# Patient Record
Sex: MALE | Race: WHITE | NOT HISPANIC OR LATINO | Employment: OTHER | URBAN - METROPOLITAN AREA
[De-identification: names, ages, dates, MRNs, and addresses within clinical notes are randomized per-mention and may not be internally consistent; named-entity substitution may affect disease eponyms.]

---

## 2017-02-13 ENCOUNTER — APPOINTMENT (OUTPATIENT)
Dept: CT IMAGING | Facility: HOSPITAL | Age: 62
End: 2017-02-13

## 2017-02-13 ENCOUNTER — HOSPITAL ENCOUNTER (INPATIENT)
Facility: HOSPITAL | Age: 62
LOS: 6 days | Discharge: HOME | End: 2017-02-19
Attending: EMERGENCY MEDICINE | Admitting: HOSPITALIST

## 2017-02-13 DIAGNOSIS — L03.114 CELLULITIS OF LEFT UPPER EXTREMITY: ICD-10-CM

## 2017-02-13 DIAGNOSIS — L03.113 CELLULITIS OF RIGHT UPPER EXTREMITY: ICD-10-CM

## 2017-02-13 DIAGNOSIS — M54.50 ACUTE MIDLINE LOW BACK PAIN WITHOUT SCIATICA: ICD-10-CM

## 2017-02-13 DIAGNOSIS — A41.9 SEPSIS, DUE TO UNSPECIFIED ORGANISM: Primary | ICD-10-CM

## 2017-02-13 LAB
ALBUMIN SERPL-MCNC: 4 G/DL (ref 3.5–5.2)
ALBUMIN/GLOB SERPL: 1.1 G/DL
ALP SERPL-CCNC: 53 U/L (ref 39–117)
ALT SERPL W P-5'-P-CCNC: 38 U/L (ref 1–41)
ANION GAP SERPL CALCULATED.3IONS-SCNC: 16.8 MMOL/L
AST SERPL-CCNC: 28 U/L (ref 1–40)
BACTERIA UR QL AUTO: ABNORMAL /HPF
BASOPHILS # BLD AUTO: 0.02 10*3/MM3 (ref 0–0.2)
BASOPHILS NFR BLD AUTO: 0.1 % (ref 0–1.5)
BILIRUB SERPL-MCNC: 0.9 MG/DL (ref 0.1–1.2)
BILIRUB UR QL STRIP: NEGATIVE
BUN BLD-MCNC: 18 MG/DL (ref 8–23)
BUN/CREAT SERPL: 17.5 (ref 7–25)
CALCIUM SPEC-SCNC: 9.3 MG/DL (ref 8.6–10.5)
CHLORIDE SERPL-SCNC: 100 MMOL/L (ref 98–107)
CLARITY UR: ABNORMAL
CO2 SERPL-SCNC: 22.2 MMOL/L (ref 22–29)
COLOR UR: ABNORMAL
CREAT BLD-MCNC: 1.03 MG/DL (ref 0.76–1.27)
CRP SERPL-MCNC: 5.46 MG/DL (ref 0–0.5)
D-LACTATE SERPL-SCNC: 2.5 MMOL/L (ref 0.5–2)
DEPRECATED RDW RBC AUTO: 57.3 FL (ref 37–54)
EOSINOPHIL # BLD AUTO: 0.01 10*3/MM3 (ref 0–0.7)
EOSINOPHIL NFR BLD AUTO: 0.1 % (ref 0.3–6.2)
ERYTHROCYTE [DISTWIDTH] IN BLOOD BY AUTOMATED COUNT: 16 % (ref 11.5–14.5)
ERYTHROCYTE [SEDIMENTATION RATE] IN BLOOD: 37 MM/HR (ref 0–20)
GFR SERPL CREATININE-BSD FRML MDRD: 74 ML/MIN/1.73
GLOBULIN UR ELPH-MCNC: 3.6 GM/DL
GLUCOSE BLD-MCNC: 82 MG/DL (ref 65–99)
GLUCOSE UR STRIP-MCNC: NEGATIVE MG/DL
HCT VFR BLD AUTO: 40.7 % (ref 40.4–52.2)
HGB BLD-MCNC: 13.5 G/DL (ref 13.7–17.6)
HGB UR QL STRIP.AUTO: NEGATIVE
HYALINE CASTS UR QL AUTO: ABNORMAL /LPF
IMM GRANULOCYTES # BLD: 0.09 10*3/MM3 (ref 0–0.03)
IMM GRANULOCYTES NFR BLD: 0.5 % (ref 0–0.5)
KETONES UR QL STRIP: ABNORMAL
LEUKOCYTE ESTERASE UR QL STRIP.AUTO: ABNORMAL
LYMPHOCYTES # BLD AUTO: 1.24 10*3/MM3 (ref 0.9–4.8)
LYMPHOCYTES NFR BLD AUTO: 7.4 % (ref 19.6–45.3)
MCH RBC QN AUTO: 32.4 PG (ref 27–32.7)
MCHC RBC AUTO-ENTMCNC: 33.2 G/DL (ref 32.6–36.4)
MCV RBC AUTO: 97.6 FL (ref 79.8–96.2)
MONOCYTES # BLD AUTO: 1.86 10*3/MM3 (ref 0.2–1.2)
MONOCYTES NFR BLD AUTO: 11.2 % (ref 5–12)
NEUTROPHILS # BLD AUTO: 13.46 10*3/MM3 (ref 1.9–8.1)
NEUTROPHILS NFR BLD AUTO: 80.7 % (ref 42.7–76)
NITRITE UR QL STRIP: NEGATIVE
PH UR STRIP.AUTO: 6 [PH] (ref 5–8)
PLATELET # BLD AUTO: 252 10*3/MM3 (ref 140–500)
PMV BLD AUTO: 10.3 FL (ref 6–12)
POTASSIUM BLD-SCNC: 4.1 MMOL/L (ref 3.5–5.2)
PROT SERPL-MCNC: 7.6 G/DL (ref 6–8.5)
PROT UR QL STRIP: ABNORMAL
RBC # BLD AUTO: 4.17 10*6/MM3 (ref 4.6–6)
RBC # UR: ABNORMAL /HPF
REF LAB TEST METHOD: ABNORMAL
SODIUM BLD-SCNC: 139 MMOL/L (ref 136–145)
SP GR UR STRIP: >=1.03 (ref 1–1.03)
SQUAMOUS #/AREA URNS HPF: ABNORMAL /HPF
UROBILINOGEN UR QL STRIP: ABNORMAL
WBC NRBC COR # BLD: 16.68 10*3/MM3 (ref 4.5–10.7)
WBC UR QL AUTO: ABNORMAL /HPF

## 2017-02-13 PROCEDURE — 99285 EMERGENCY DEPT VISIT HI MDM: CPT

## 2017-02-13 PROCEDURE — 87150 DNA/RNA AMPLIFIED PROBE: CPT | Performed by: PHYSICIAN ASSISTANT

## 2017-02-13 PROCEDURE — 25010000002 HYDROMORPHONE PER 4 MG: Performed by: EMERGENCY MEDICINE

## 2017-02-13 PROCEDURE — 85025 COMPLETE CBC W/AUTO DIFF WBC: CPT | Performed by: PHYSICIAN ASSISTANT

## 2017-02-13 PROCEDURE — 87086 URINE CULTURE/COLONY COUNT: CPT | Performed by: PHYSICIAN ASSISTANT

## 2017-02-13 PROCEDURE — 80053 COMPREHEN METABOLIC PANEL: CPT | Performed by: PHYSICIAN ASSISTANT

## 2017-02-13 PROCEDURE — 25010000002 ONDANSETRON PER 1 MG: Performed by: EMERGENCY MEDICINE

## 2017-02-13 PROCEDURE — 86140 C-REACTIVE PROTEIN: CPT | Performed by: PHYSICIAN ASSISTANT

## 2017-02-13 PROCEDURE — 74176 CT ABD & PELVIS W/O CONTRAST: CPT

## 2017-02-13 PROCEDURE — 83605 ASSAY OF LACTIC ACID: CPT | Performed by: PHYSICIAN ASSISTANT

## 2017-02-13 PROCEDURE — 87040 BLOOD CULTURE FOR BACTERIA: CPT | Performed by: PHYSICIAN ASSISTANT

## 2017-02-13 PROCEDURE — 85652 RBC SED RATE AUTOMATED: CPT | Performed by: PHYSICIAN ASSISTANT

## 2017-02-13 PROCEDURE — 87186 SC STD MICRODIL/AGAR DIL: CPT | Performed by: PHYSICIAN ASSISTANT

## 2017-02-13 PROCEDURE — 81001 URINALYSIS AUTO W/SCOPE: CPT | Performed by: PHYSICIAN ASSISTANT

## 2017-02-13 PROCEDURE — 87147 CULTURE TYPE IMMUNOLOGIC: CPT | Performed by: PHYSICIAN ASSISTANT

## 2017-02-13 RX ORDER — ONDANSETRON 2 MG/ML
4 INJECTION INTRAMUSCULAR; INTRAVENOUS ONCE
Status: COMPLETED | OUTPATIENT
Start: 2017-02-13 | End: 2017-02-13

## 2017-02-13 RX ORDER — ONDANSETRON 2 MG/ML
4 INJECTION INTRAMUSCULAR; INTRAVENOUS ONCE
Status: DISCONTINUED | OUTPATIENT
Start: 2017-02-13 | End: 2017-02-13

## 2017-02-13 RX ORDER — SODIUM CHLORIDE 0.9 % (FLUSH) 0.9 %
10 SYRINGE (ML) INJECTION AS NEEDED
Status: DISCONTINUED | OUTPATIENT
Start: 2017-02-13 | End: 2017-02-19 | Stop reason: HOSPADM

## 2017-02-13 RX ORDER — ACETAMINOPHEN 500 MG
1000 TABLET ORAL ONCE
Status: COMPLETED | OUTPATIENT
Start: 2017-02-13 | End: 2017-02-13

## 2017-02-13 RX ADMIN — ACETAMINOPHEN 1000 MG: 500 TABLET ORAL at 20:44

## 2017-02-13 RX ADMIN — SODIUM CHLORIDE 1382 ML: 9 INJECTION, SOLUTION INTRAVENOUS at 21:21

## 2017-02-13 RX ADMIN — HYDROMORPHONE HYDROCHLORIDE 1 MG: 1 INJECTION, SOLUTION INTRAMUSCULAR; INTRAVENOUS; SUBCUTANEOUS at 21:20

## 2017-02-13 RX ADMIN — ONDANSETRON 4 MG: 2 INJECTION INTRAMUSCULAR; INTRAVENOUS at 21:17

## 2017-02-13 RX ADMIN — SODIUM CHLORIDE 1000 ML: 9 INJECTION, SOLUTION INTRAVENOUS at 20:50

## 2017-02-14 ENCOUNTER — APPOINTMENT (OUTPATIENT)
Dept: ULTRASOUND IMAGING | Facility: HOSPITAL | Age: 62
End: 2017-02-14
Attending: SURGERY

## 2017-02-14 ENCOUNTER — APPOINTMENT (OUTPATIENT)
Dept: MRI IMAGING | Facility: HOSPITAL | Age: 62
End: 2017-02-14
Attending: HOSPITALIST

## 2017-02-14 ENCOUNTER — APPOINTMENT (OUTPATIENT)
Dept: CARDIOLOGY | Facility: HOSPITAL | Age: 62
End: 2017-02-14
Attending: HOSPITALIST

## 2017-02-14 PROBLEM — L03.119 CELLULITIS OF UPPER EXTREMITY: Status: ACTIVE | Noted: 2017-02-14

## 2017-02-14 PROBLEM — M54.50 LOW BACK PAIN: Status: ACTIVE | Noted: 2017-02-14

## 2017-02-14 PROBLEM — F19.10 DRUG ABUSE (HCC): Status: ACTIVE | Noted: 2017-02-14

## 2017-02-14 LAB
ANION GAP SERPL CALCULATED.3IONS-SCNC: 14.2 MMOL/L
BACTERIA BLD CULT: ABNORMAL
BASOPHILS # BLD AUTO: 0.02 10*3/MM3 (ref 0–0.2)
BASOPHILS NFR BLD AUTO: 0.1 % (ref 0–1.5)
BUN BLD-MCNC: 17 MG/DL (ref 8–23)
BUN/CREAT SERPL: 19.8 (ref 7–25)
CALCIUM SPEC-SCNC: 8.4 MG/DL (ref 8.6–10.5)
CHLORIDE SERPL-SCNC: 104 MMOL/L (ref 98–107)
CO2 SERPL-SCNC: 21.8 MMOL/L (ref 22–29)
CREAT BLD-MCNC: 0.86 MG/DL (ref 0.76–1.27)
D-LACTATE SERPL-SCNC: 0.7 MMOL/L (ref 0.5–2)
DEPRECATED RDW RBC AUTO: 59.5 FL (ref 37–54)
EOSINOPHIL # BLD AUTO: 0.02 10*3/MM3 (ref 0–0.7)
EOSINOPHIL NFR BLD AUTO: 0.1 % (ref 0.3–6.2)
ERYTHROCYTE [DISTWIDTH] IN BLOOD BY AUTOMATED COUNT: 16.4 % (ref 11.5–14.5)
GFR SERPL CREATININE-BSD FRML MDRD: 91 ML/MIN/1.73
GLUCOSE BLD-MCNC: 87 MG/DL (ref 65–99)
HCT VFR BLD AUTO: 36.7 % (ref 40.4–52.2)
HGB BLD-MCNC: 11.9 G/DL (ref 13.7–17.6)
IMM GRANULOCYTES # BLD: 0.06 10*3/MM3 (ref 0–0.03)
IMM GRANULOCYTES NFR BLD: 0.4 % (ref 0–0.5)
LYMPHOCYTES # BLD AUTO: 1.51 10*3/MM3 (ref 0.9–4.8)
LYMPHOCYTES NFR BLD AUTO: 10.4 % (ref 19.6–45.3)
MCH RBC QN AUTO: 32.1 PG (ref 27–32.7)
MCHC RBC AUTO-ENTMCNC: 32.4 G/DL (ref 32.6–36.4)
MCV RBC AUTO: 98.9 FL (ref 79.8–96.2)
MONOCYTES # BLD AUTO: 1.25 10*3/MM3 (ref 0.2–1.2)
MONOCYTES NFR BLD AUTO: 8.6 % (ref 5–12)
NEUTROPHILS # BLD AUTO: 11.61 10*3/MM3 (ref 1.9–8.1)
NEUTROPHILS NFR BLD AUTO: 80.4 % (ref 42.7–76)
PLATELET # BLD AUTO: 257 10*3/MM3 (ref 140–500)
PMV BLD AUTO: 10 FL (ref 6–12)
POTASSIUM BLD-SCNC: 4.1 MMOL/L (ref 3.5–5.2)
PROCALCITONIN SERPL-MCNC: 0.14 NG/ML (ref 0.1–0.25)
RBC # BLD AUTO: 3.71 10*6/MM3 (ref 4.6–6)
SODIUM BLD-SCNC: 140 MMOL/L (ref 136–145)
WBC NRBC COR # BLD: 14.47 10*3/MM3 (ref 4.5–10.7)

## 2017-02-14 PROCEDURE — 25010000002 LORAZEPAM PER 2 MG: Performed by: HOSPITALIST

## 2017-02-14 PROCEDURE — 85025 COMPLETE CBC W/AUTO DIFF WBC: CPT | Performed by: HOSPITALIST

## 2017-02-14 PROCEDURE — 87081 CULTURE SCREEN ONLY: CPT | Performed by: HOSPITALIST

## 2017-02-14 PROCEDURE — 76882 US LMTD JT/FCL EVL NVASC XTR: CPT

## 2017-02-14 PROCEDURE — 99255 IP/OBS CONSLTJ NEW/EST HI 80: CPT | Performed by: INTERNAL MEDICINE

## 2017-02-14 PROCEDURE — 80048 BASIC METABOLIC PNL TOTAL CA: CPT | Performed by: HOSPITALIST

## 2017-02-14 PROCEDURE — 25010000002 VANCOMYCIN: Performed by: HOSPITALIST

## 2017-02-14 PROCEDURE — 93306 TTE W/DOPPLER COMPLETE: CPT | Performed by: INTERNAL MEDICINE

## 2017-02-14 PROCEDURE — A9577 INJ MULTIHANCE: HCPCS | Performed by: HOSPITALIST

## 2017-02-14 PROCEDURE — 25010000002 HYDROMORPHONE PER 4 MG: Performed by: HOSPITALIST

## 2017-02-14 PROCEDURE — 25010000003 CEFTRIAXONE PER 250 MG: Performed by: HOSPITALIST

## 2017-02-14 PROCEDURE — 72158 MRI LUMBAR SPINE W/O & W/DYE: CPT

## 2017-02-14 PROCEDURE — 84145 PROCALCITONIN (PCT): CPT | Performed by: INTERNAL MEDICINE

## 2017-02-14 PROCEDURE — 25010000002 ONDANSETRON PER 1 MG: Performed by: HOSPITALIST

## 2017-02-14 PROCEDURE — 0 GADOBENATE DIMEGLUMINE 529 MG/ML SOLUTION: Performed by: HOSPITALIST

## 2017-02-14 PROCEDURE — 83605 ASSAY OF LACTIC ACID: CPT | Performed by: EMERGENCY MEDICINE

## 2017-02-14 PROCEDURE — 93306 TTE W/DOPPLER COMPLETE: CPT

## 2017-02-14 RX ORDER — CHOLECALCIFEROL (VITAMIN D3) 125 MCG
5 CAPSULE ORAL NIGHTLY
COMMUNITY

## 2017-02-14 RX ORDER — CEFTRIAXONE SODIUM 2 G/50ML
2 INJECTION, SOLUTION INTRAVENOUS EVERY 24 HOURS
Status: DISCONTINUED | OUTPATIENT
Start: 2017-02-15 | End: 2017-02-15

## 2017-02-14 RX ORDER — DOCUSATE SODIUM 100 MG/1
100 CAPSULE, LIQUID FILLED ORAL 2 TIMES DAILY PRN
Status: DISCONTINUED | OUTPATIENT
Start: 2017-02-14 | End: 2017-02-17

## 2017-02-14 RX ORDER — ACETAMINOPHEN 325 MG/1
650 TABLET ORAL EVERY 6 HOURS PRN
Status: DISCONTINUED | OUTPATIENT
Start: 2017-02-14 | End: 2017-02-19 | Stop reason: HOSPADM

## 2017-02-14 RX ORDER — ONDANSETRON 2 MG/ML
4 INJECTION INTRAMUSCULAR; INTRAVENOUS EVERY 6 HOURS PRN
Status: DISCONTINUED | OUTPATIENT
Start: 2017-02-14 | End: 2017-02-19 | Stop reason: HOSPADM

## 2017-02-14 RX ORDER — CEFTRIAXONE SODIUM 2 G/50ML
2 INJECTION, SOLUTION INTRAVENOUS EVERY 12 HOURS
Status: DISCONTINUED | OUTPATIENT
Start: 2017-02-14 | End: 2017-02-14

## 2017-02-14 RX ORDER — LORAZEPAM 2 MG/ML
1 INJECTION INTRAMUSCULAR EVERY 4 HOURS PRN
Status: DISCONTINUED | OUTPATIENT
Start: 2017-02-14 | End: 2017-02-16

## 2017-02-14 RX ORDER — HYDROMORPHONE HYDROCHLORIDE 1 MG/ML
0.5 INJECTION, SOLUTION INTRAMUSCULAR; INTRAVENOUS; SUBCUTANEOUS
Status: DISCONTINUED | OUTPATIENT
Start: 2017-02-14 | End: 2017-02-15

## 2017-02-14 RX ORDER — CHOLECALCIFEROL (VITAMIN D3) 125 MCG
5 CAPSULE ORAL NIGHTLY
Status: DISCONTINUED | OUTPATIENT
Start: 2017-02-14 | End: 2017-02-19 | Stop reason: HOSPADM

## 2017-02-14 RX ADMIN — HYDROMORPHONE HYDROCHLORIDE 0.5 MG: 1 INJECTION, SOLUTION INTRAMUSCULAR; INTRAVENOUS; SUBCUTANEOUS at 08:23

## 2017-02-14 RX ADMIN — LORAZEPAM 1 MG: 2 INJECTION INTRAMUSCULAR; INTRAVENOUS at 08:42

## 2017-02-14 RX ADMIN — ONDANSETRON 4 MG: 2 INJECTION INTRAMUSCULAR; INTRAVENOUS at 20:49

## 2017-02-14 RX ADMIN — HYDROMORPHONE HYDROCHLORIDE 0.5 MG: 1 INJECTION, SOLUTION INTRAMUSCULAR; INTRAVENOUS; SUBCUTANEOUS at 02:42

## 2017-02-14 RX ADMIN — VANCOMYCIN HYDROCHLORIDE 2000 MG: 1 INJECTION, POWDER, LYOPHILIZED, FOR SOLUTION INTRAVENOUS at 08:22

## 2017-02-14 RX ADMIN — HYDROMORPHONE HYDROCHLORIDE 0.5 MG: 1 INJECTION, SOLUTION INTRAMUSCULAR; INTRAVENOUS; SUBCUTANEOUS at 05:41

## 2017-02-14 RX ADMIN — CEFTRIAXONE SODIUM 2 G: 2 INJECTION, SOLUTION INTRAVENOUS at 08:23

## 2017-02-14 RX ADMIN — HYDROMORPHONE HYDROCHLORIDE 0.5 MG: 1 INJECTION, SOLUTION INTRAMUSCULAR; INTRAVENOUS; SUBCUTANEOUS at 20:45

## 2017-02-14 RX ADMIN — GADOBENATE DIMEGLUMINE 15 ML: 529 INJECTION, SOLUTION INTRAVENOUS at 09:46

## 2017-02-14 RX ADMIN — ACETAMINOPHEN 650 MG: 325 TABLET ORAL at 17:07

## 2017-02-14 RX ADMIN — VANCOMYCIN HYDROCHLORIDE 1500 MG: 1 INJECTION, POWDER, LYOPHILIZED, FOR SOLUTION INTRAVENOUS at 18:35

## 2017-02-14 RX ADMIN — DOCUSATE SODIUM 100 MG: 100 CAPSULE, LIQUID FILLED ORAL at 17:07

## 2017-02-14 NOTE — PLAN OF CARE
Problem: Patient Care Overview (Adult)  Goal: Plan of Care Review  Outcome: Ongoing (interventions implemented as appropriate)    02/14/17 1527   Outcome Evaluation   Outcome Summary/Follow up Plan patient had MRI and US today, pain is well managed with PRN dilaudid, continued treatment as prescibed.        Goal: Adult Individualization and Mutuality  Outcome: Ongoing (interventions implemented as appropriate)  Goal: Discharge Needs Assessment  Outcome: Ongoing (interventions implemented as appropriate)    Problem: Pain, Acute (Adult)  Goal: Identify Related Risk Factors and Signs and Symptoms  Outcome: Ongoing (interventions implemented as appropriate)  Goal: Acceptable Pain Control/Comfort Level  Outcome: Ongoing (interventions implemented as appropriate)    Problem: Cellulitis (Adult)  Goal: Signs and Symptoms of Listed Potential Problems Will be Absent or Manageable (Cellulitis)  Outcome: Ongoing (interventions implemented as appropriate)

## 2017-02-14 NOTE — PROGRESS NOTES
Attempted eval twice.  Pt was sleeping the first time.  Had been medicated and just got back from MRI.  Returned later and pt was off floor getting ultrasound.  Access will return.

## 2017-02-14 NOTE — PAYOR COMM NOTE
"Tre Owens (61 y.o. Male)             ATTENTION; NURSE REVIEW, AUTH PENDING [id number], CLINICALS FOR YOUR REVIEW, REPLY TO UR DEPT         ALVIN CHAVIS N  OR UR  [phone]       Date of Birth Social Security Number Address Home Phone MRN    1955  [id number] [address] [phone] [id number]    Zoroastrianism Marital Status          Mosque Single       Admission Date Admission Type Admitting Provider Attending Provider Department, Room/Bed    2/13/17 Emergency Axel De Jesus MD Edling, Stephen A, MD [address], 409/1    Discharge Date Discharge Disposition Discharge Destination                      Attending Provider: Dwight Rivera MD     Allergies:  No Known Allergies    Isolation:  None   Infection:  None   Code Status:  Not on file    Ht:  73\" (185.4 cm)   Wt:  171 lb 4.8 oz (77.7 kg)    Admission Cmt:  None   Principal Problem:  Sepsis [A41.9]                 Active Insurance as of 2/13/2017     Primary Coverage     Payor Plan Insurance Group Employer/Plan Group    [employer] ANTH PATHWAYS PAR [id number]     Payor Plan Address Payor Plan Phone Number Effective From Effective To    [phone] 1/1/2017     [place] [address]       Subscriber Name Subscriber Birth Date Member ID       TRE OWENS 1955 [id number]                 Emergency Contacts      (Rel.) Home Phone Work Phone Mobile Phone    Alverto Myrick (Sister) [phone] -- --               Emergency Department Notes      Selina Robins RN at 2/13/2017  7:51 PM          EMS reports patient complains of bilateral flank pain since last night with painful urinated. Patient also complains of bilateral abscesses in his AC d/t IV drug abuse.      Selina Robins RN  02/13/17 1952       Electronically signed by Selina Robins RN at 2/13/2017  7:52 PM      MIKEY Bradley III at 2/13/2017  8:18 PM           EMERGENCY " "DEPARTMENT ENCOUNTER    CHIEF COMPLAINT  Chief Complaint: flank pain  History given by: patient  History limited by: none  Room Number: 409/1  PMD: Kishor Camacho MD      HPI:  Pt is a 61 y.o. male who presents complaining of bilateral flank pain onset this morning at 4 AM and worsening today around 6 PM. Pt has a hx of \"adrenal fatigue\" and is a recovering alcoholic. His pain is worse with bending and position changes. He also has fatigue and chills. Pt states that he injected cocaine a few days ago at a site which he thinks now might be infected.    Duration:  2 days  Onset: gradual  Timing: constant  Location: bilateral flank  Radiation: none  Quality: \"pain\"  Intensity/Severity: severe  Progression: worsening since 6 PM today  Associated Symptoms: fatigue, chills  Aggravating Factors: movement, bending  Alleviating Factors: remaining still  Previous Episodes: hx of \"adrenal fatigue\", no hx of kidney stones  Treatment before arrival: none stated      Past Medical History   Diagnosis Date   • Alcoholism    • Kidney failure        PAST SURGICAL HISTORY  Past Surgical History   Procedure Laterality Date   • Appendectomy         FAMILY HISTORY  Family History   Problem Relation Age of Onset   • Alcohol abuse Father        SOCIAL HISTORY  Social History     Social History   • Marital status: Single     Spouse name: N/A   • Number of children: N/A   • Years of education: N/A     Occupational History   • property management      Social History Main Topics   • Smoking status: Never Smoker   • Smokeless tobacco: Not on file   • Alcohol use No   • Drug use: Yes     Special: Cocaine   • Sexual activity: Not on file     Other Topics Concern   • Not on file     Social History Narrative       ALLERGIES  Review of patient's allergies indicates no known allergies.    REVIEW OF SYSTEMS  Review of Systems   Constitutional: Positive for chills, fatigue and fever. Negative for activity change and appetite change.   HENT: " Negative for congestion and sore throat.    Eyes: Negative.    Respiratory: Negative for cough and shortness of breath.    Cardiovascular: Negative for chest pain and leg swelling.   Gastrointestinal: Negative for abdominal pain, diarrhea and vomiting.   Endocrine: Negative.    Genitourinary: Positive for flank pain (bilateral). Negative for decreased urine volume and dysuria.   Musculoskeletal: Negative for neck pain.   Skin: Negative for rash and wound.   Allergic/Immunologic: Negative.    Neurological: Negative for weakness, numbness and headaches.   Hematological: Negative.    Psychiatric/Behavioral: Negative.    All other systems reviewed and are negative.      PHYSICAL EXAM  ED Triage Vitals   Temp Heart Rate Resp BP SpO2   02/13/17 1953 02/13/17 1953 02/13/17 1953 02/13/17 1953 02/13/17 1953   101.4 °F (38.6 °C) 86 16 130/72 98 %      Temp src Heart Rate Source Patient Position BP Location FiO2 (%)   -- -- -- -- --              Physical Exam   Constitutional: He is oriented to person, place, and time and well-developed, well-nourished, and in no distress.   HENT:   Head: Normocephalic and atraumatic.   Eyes: EOM are normal. Pupils are equal, round, and reactive to light.   Neck: Normal range of motion. Neck supple.   Cardiovascular: Regular rhythm and normal heart sounds.  Tachycardia present.    Pulmonary/Chest: Effort normal and breath sounds normal. No respiratory distress.   Abdominal: Soft. There is no tenderness. There is no rebound and no guarding.   Musculoskeletal: Normal range of motion. He exhibits no edema.        Lumbar back: He exhibits tenderness.   Neurological: He is alert and oriented to person, place, and time. He has normal sensation and normal strength.   Skin: Skin is warm and dry.   Small abscess, left forearm   Psychiatric: Mood and affect normal.   Nursing note and vitals reviewed.      LAB RESULTS  Lab Results (last 24 hours)     Procedure Component Value Units Date/Time    CBC &  Differential [[id number]] Collected:  02/13/17 2043    Specimen:  Blood Updated:  02/13/17 2101    Narrative:       The following orders were created for panel order CBC & Differential.  Procedure                               Abnormality         Status                     ---------                               -----------         ------                     CBC Auto Differential[[id number]]         Abnormal            Final result                 Please view results for these tests on the individual orders.    Comprehensive Metabolic Panel [[id number]] Collected:  02/13/17 2043    Specimen:  Blood Updated:  02/13/17 2123     Glucose 82 mg/dL      BUN 18 mg/dL      Creatinine 1.03 mg/dL      Sodium 139 mmol/L      Potassium 4.1 mmol/L      Chloride 100 mmol/L      CO2 22.2 mmol/L      Calcium 9.3 mg/dL      Total Protein 7.6 g/dL      Albumin 4.00 g/dL      ALT (SGPT) 38 U/L      AST (SGOT) 28 U/L      Alkaline Phosphatase 53 U/L      Total Bilirubin 0.9 mg/dL      eGFR Non African Amer 74 mL/min/1.73      Globulin 3.6 gm/dL      A/G Ratio 1.1 g/dL      BUN/Creatinine Ratio 17.5      Anion Gap 16.8 mmol/L     Lactic Acid, Plasma [[id number]]  (Abnormal) Collected:  02/13/17 2043    Specimen:  Blood Updated:  02/13/17 2117     Lactate 2.5 (C) mmol/L     Blood Culture [[id number]] Collected:  02/13/17 2043    Specimen:  Blood from Arm, Right Updated:  02/13/17 2052    CBC Auto Differential [[id number]]  (Abnormal) Collected:  02/13/17 2043    Specimen:  Blood Updated:  02/13/17 2101     WBC 16.68 (H) 10*3/mm3      RBC 4.17 (L) 10*6/mm3      Hemoglobin 13.5 (L) g/dL      Hematocrit 40.7 %      MCV 97.6 (H) fL      MCH 32.4 pg      MCHC 33.2 g/dL      RDW 16.0 (H) %      RDW-SD 57.3 (H) fl      MPV 10.3 fL      Platelets 252 10*3/mm3      Neutrophil % 80.7 (H) %      Lymphocyte % 7.4 (L) %      Monocyte % 11.2 %      Eosinophil % 0.1 (L) %      Basophil % 0.1 %      Immature Grans % 0.5 %      Neutrophils, Absolute 13.46 (H)  10*3/mm3      Lymphocytes, Absolute 1.24 10*3/mm3      Monocytes, Absolute 1.86 (H) 10*3/mm3      Eosinophils, Absolute 0.01 10*3/mm3      Basophils, Absolute 0.02 10*3/mm3      Immature Grans, Absolute 0.09 (H) 10*3/mm3     Sedimentation Rate [[id number]]  (Abnormal) Collected:  02/13/17 2043    Specimen:  Blood Updated:  02/13/17 2308     Sed Rate 37 (H) mm/hr     C-reactive Protein [[id number]]  (Abnormal) Collected:  02/13/17 2043    Specimen:  Blood Updated:  02/13/17 2219     C-Reactive Protein 5.46 (H) mg/dL     Urinalysis With / Culture If Indicated [[id number]]  (Abnormal) Collected:  02/13/17 2257    Specimen:  Urine from Urine, Clean Catch Updated:  02/13/17 2325     Color, UA Dark Yellow (A)      Appearance, UA Cloudy (A)      pH, UA 6.0      Specific Gravity, UA >=1.030      Glucose, UA Negative      Ketones, UA 40 mg/dL (2+) (A)      Bilirubin, UA Negative      Blood, UA Negative      Protein, UA 30 mg/dL (1+) (A)      Leuk Esterase, UA Small (1+) (A)      Nitrite, UA Negative      Urobilinogen, UA 1.0 E.U./dL     Urinalysis, Microscopic Only [[id number]]  (Abnormal) Collected:  02/13/17 2257    Specimen:  Urine from Urine, Clean Catch Updated:  02/13/17 2334     RBC, UA 6-12 (A) /HPF      WBC, UA 3-5 (A) /HPF      Bacteria, UA None Seen /HPF      Squamous Epithelial Cells, UA 0-2 /HPF      Hyaline Casts, UA 21-30 /LPF      Methodology Manual Light Microscopy     Urine Culture [[id number]] Collected:  02/13/17 2257    Specimen:  Urine from Urine, Clean Catch Updated:  02/13/17 2317    Lactate Acid, Reflex [[id number]]  (Normal) Collected:  02/14/17 0056    Specimen:  Blood Updated:  02/14/17 0126     Lactate 0.7 mmol/L           I ordered the above labs and reviewed the results    RADIOLOGY  CT Abdomen Pelvis Without Contrast   Preliminary Result   1.  No definite acute abdominal pathology, no obstructive uropathy or   inflammatory bowel disease.    2.  3.6 cm left renal cyst. Scarring in the upper pole left  kidney with   cortical calcifications measuring up to 0.6 cm. No hydronephrosis.   Please note that urinary tract infection/pyelonephritis cannot be   excluded on noncontrast examination.   3.  Appendix is not visualized. Moderately large amount of stool in the   colon, patient may be constipated.   4.  If indicated further evaluation with contrast-enhanced CT scan may   be performed.              MRI Lumbar Spine Without Contrast    (Results Pending)      I ordered the above noted radiological studies. Interpreted by radiologist. Reviewed by me in PACS.       PROCEDURES  Procedures      PROGRESS AND CONSULTS  ED Course   Value Comment By Time   WBC: (!) 16.68 (Reviewed) Kalen Jarvis MD 02/13 2139 2025 - Labs, CT abd/pelvis ordered for further evaluation. Dilaudid and Zofran ordered for pain and nausea, IVF ordered for hydration, Tylenol ordered for fever.    2241 - Lactate and WBC elevated, will admit for inpatient abx. Sepsis fluid bolus has been ordered.    2324 - Call w/ Dr. De Jesus who agrees to admit the pt.      MEDICAL DECISION MAKING  Results were reviewed/discussed with the patient and they were also made aware of online access. Pt also made aware that some labs, such as cultures, will not be resulted during ER visit and follow up with PMD is necessary.     MDM  Number of Diagnoses or Management Options     Amount and/or Complexity of Data Reviewed  Clinical lab tests: ordered and reviewed (Lactate 2.5, WBC 16.68)  Tests in the radiology section of CPT®:  ordered and reviewed (CT abd/pelvis - no acute abdominal pathology, 3.6 cm left renal cyst, moderately large amount of stool)  Discuss the patient with other providers: yes  Independent visualization of images, tracings, or specimens: yes         DIAGNOSIS  Final diagnoses:   Sepsis, due to unspecified organism   Cellulitis of left upper extremity   Cellulitis of right upper extremity   Acute midline low back pain without sciatica        DISPOSITION  ADMISSION: Patient admitted by Dr. De Jesus to telemetry.    Discussed treatment plan and reason for admission with pt/family and admitting physician.  Pt/family voiced understanding of the plan for admission for further testing/treatment as needed.     Latest Documented Vital Signs:  As of 5:39 AM  BP- 102/65 HR- 82 Temp- 98.4 °F (36.9 °C) (Oral) O2 sat- 98%    --  Documentation assistance provided by hailey Mcdonald III, PA for Adryan Mcdonald PA-C.  Information recorded by the scribe was done at my direction and has been verified and validated by me.        Bareden Goldberg  02/13/17 5576       MIKEY Bradley III  02/14/17 3712       Electronically signed by MIKEY Bradley III at 2/14/2017  5:39 AM      Kelsy Willett RN at 2/13/2017  8:37 PM          Pt c/o bilateral flank pain that has been going on all weekend although worsened tonight. He has no hx of kidney stones; he believes it is a kidney infection. Pt does appear in pain. Reassurance given; call light in reach. Pts breathing even and unlabored.       Kelsy Willett RN  02/13/17 3857       Electronically signed by Kelsy Willett RN at 2/13/2017 11:38 PM      Kelsy Willett RN at 2/13/2017  9:24 PM          Pt informed need of urine sample at this time.     Kelsy Willett RN  02/13/17 9197       Electronically signed by Kelsy Willett RN at 2/13/2017  9:24 PM        Vital Signs (last 24 hours)       02/13 0700  -  02/14 0659 02/14 0700  -  02/14 1111   Most Recent    Temp (°F) 97.7 -  (!)101.4      98.9     98.9 (37.2)    Heart Rate 82 -  117      107     107    Resp 16 -  22      20     20    /60 -  130/72      117/75     117/75    SpO2 (%) 91 -  100      96     96          Lines, Drains & Airways    Active LDAs     Name:   Placement date:   Placement time:   Site:   Days:    Peripheral IV Line - Single Lumen 02/13/17 2043 metacarpal vein (top of hand), right 20 gauge  02/13/17 2043       less than 1                   Hospital Medications (all)       Dose Frequency Start End    acetaminophen (TYLENOL) tablet 1,000 mg 1,000 mg Once 2/13/2017 2/13/2017    Sig - Route: Take 2 tablets by mouth 1 (One) Time. - Oral    Cosign for Ordering: Accepted by Kalen aJrvis MD on 2/13/2017 11:03 PM    cefTRIAXone (ROCEPHIN) IVPB 2 g 2 g Every 24 Hours 2/15/2017     Sig - Route: Infuse 50 mL into a venous catheter Daily. - Intravenous    gadobenate dimeglumine (MULTIHANCE) injection 15 mL 15 mL Once in Imaging 2/14/2017 2/14/2017    Sig - Route: Infuse 15 mL into a venous catheter Once. - Intravenous    HYDROmorphone (DILAUDID) injection 0.5 mg 0.5 mg Every 2 Hours PRN 2/14/2017 2/24/2017    Sig - Route: Infuse 0.5 mg into a venous catheter Every 2 (Two) Hours As Needed for severe pain (7-10). - Intravenous    HYDROmorphone (DILAUDID) injection 1 mg 1 mg Once 2/13/2017 2/13/2017    Sig - Route: Infuse 1 mg into a venous catheter 1 (One) Time. - Intravenous    LORazepam (ATIVAN) injection 1 mg 1 mg Every 4 Hours PRN 2/14/2017 2/24/2017    Sig - Route: Infuse 0.5 mL into a venous catheter Every 4 (Four) Hours As Needed for anxiety. - Intravenous    melatonin tablet 5 mg 5 mg Nightly 2/14/2017     Sig - Route: Take 1 tablet by mouth Every Night. - Oral    ondansetron (ZOFRAN) injection 4 mg 4 mg Once 2/13/2017 2/13/2017    Sig - Route: Infuse 2 mL into a venous catheter 1 (One) Time. - Intravenous    ondansetron (ZOFRAN) injection 4 mg 4 mg Every 6 Hours PRN 2/14/2017     Sig - Route: Infuse 2 mL into a venous catheter Every 6 (Six) Hours As Needed for nausea or vomiting. - Intravenous    Pharmacy to dose vancomycin  Continuous PRN 2/14/2017     Sig - Route: Continuous As Needed for consult. - Does not apply    sodium chloride 0.9 % bolus 1,000 mL 1,000 mL Once 2/13/2017 2/13/2017    Sig - Route: Infuse 1,000 mL into a venous catheter 1 (One) Time. - Intravenous    Cosign for Ordering: Accepted by Kalen Jarvis MD  "on 2017 11:03 PM    sodium chloride 0.9 % bolus 1,382 mL 1,382 mL Once 2017    Sig - Route: Infuse 1,382 mL into a venous catheter 1 (One) Time. - Intravenous    Cosign for Ordering: Required by [facility]    sodium chloride 0.9 % flush 10 mL 10 mL As Needed 2017     Sig - Route: Infuse 10 mL into a venous catheter As Needed for line care. - Intravenous    Cosign for Ordering: Accepted by Kalen Jarvis MD on 2017 11:03 PM    Linked Group 1:  \"And\" Linked Group Details        vancomycin 1500 mg/500 mL 0.9% NS IVPB ([facility]) 1,500 mg Every 12 Hours 2017     Sig - Route: Infuse 500 mL into a venous catheter Every 12 (Twelve) Hours. - Intravenous    vancomycin 2000 mg/500 mL 0.9% NS IVPB ([facility]) 2,000 mg Once 2017    Sig - Route: Infuse 500 mL into a venous catheter 1 (One) Time. - Intravenous    cefTRIAXone (ROCEPHIN) IVPB 2 g (Discontinued) 2 g Every 12 Hours 2017    Sig - Route: Infuse 50 mL into a venous catheter Every 12 (Twelve) Hours. - Intravenous    ondansetron (ZOFRAN) injection 4 mg (Discontinued) 4 mg Once 2017    Sig - Route: Inject 2 mL into the shoulder, thigh, or buttocks 1 (One) Time. - Intramuscular    sodium chloride 0.9 % bolus 2,382 mL (Discontinued) 30 mL/kg × 79.4 kg Once 2017    Sig - Route: Infuse 2,382 mL into a venous catheter 1 (One) Time. - Intravenous    Cosign for Ordering: Accepted by Kalen Jarvis MD on 2017 11:03 PM           H&P  Date of Service: 2017 5:26 AM  Axel De Jesus MD   Medicine   Expand All Collapse All    []Hide copied text  []Hover for attribution information  HISTORY AND PHYSICAL  [facility]           Patient Identification:  Name: Tre Owens  Age: 61 y.o.  Sex: male  :  1955  MRN: [id number]   Primary Care Physician: Kishor Camacho MD     Chief Complaint: Severe back pain and fever     History of Present Illness:   The patient is a " 61-year-old white male with history of IV drug abuse with cocaine who is admitted with a history of severe back pain which is gotten worse over the last couple of days. He's been having fever and chills and having difficulty walking. He's had a little bit of nausea but no vomiting. He denies any chest pain or shortness of air. He's been injecting himself with cocaine in both arms and has some cellulitis and may be superficial abscesses in both arms. The patient was evaluated in the emergency room and admitted for further evaluation treatment with concern for possible infection in the lumbar spine epidural space. The patient started on some broad-spectrum antibiotics and admitted for further evaluation treatment.     Past Medical History:   Medical History         Past Medical History   Diagnosis Date   • Alcoholism     • Kidney failure           Past Surgical History:   Surgical History          Past Surgical History   Procedure Laterality Date   • Appendectomy             Home Meds:   Prescriptions Prior to Admission            Prescriptions Prior to Admission   Medication Sig Dispense Refill Last Dose   • melatonin 5 MG tablet tablet Take 5 mg by mouth Every Night.         • Multiple Vitamins-Minerals (MULTIVITAMIN ADULT PO) Take 1 tablet by mouth Daily.         • TESTOSTERONE IM Inject into the shoulder, thigh, or buttocks 3 (Three) Times a Week.                  Allergies:  No Known Allergies  Immunizations:     There is no immunization history on file for this patient.  Social History:   Social History      Social History Narrative       Social History    Social History            Social History   • Marital status: Single       Spouse name: N/A   • Number of children: N/A   • Years of education: N/A           Occupational History   • property management              Social History Main Topics   • Smoking status: Never Smoker   • Smokeless tobacco: Not on file   • Alcohol use No   • Drug use: Yes       Special:  "Cocaine   • Sexual activity: Not on file           Other Topics Concern   • Not on file      Social History Narrative            Family History:        Family History   Problem Relation Age of Onset   • Alcohol abuse Father           Review of Systems  See history of present illness and past medical history. Patient denies headache, dizziness, syncope, falls, trauma, change in vision, change in hearing, change in taste, changes in weight, changes in appetite, focal weakness, numbness, or paresthesia. Patient denies chest pain, palpitations, dyspnea, orthopnea, PND, cough, sinus pressure, rhinorrhea, epistaxis, hemoptysis, vomiting,hematemesis, diarrhea, constipation or hematchezia. Denies cold or heat intolerance, polydipsia, polyuria, polyphagia. Denies hematuria, pyuria, dysuria, hesitancy, frequency or urgency. Remainder of ROS is negative.     Objective:  tMax 24 hrs: Temp (24hrs), Av.2 °F (37.3 °C), Min:97.7 °F (36.5 °C), Max:101.4 °F (38.6 °C)     Vitals Ranges:   Temp: [97.7 °F (36.5 °C)-101.4 °F (38.6 °C)] 98.4 °F (36.9 °C)  Heart Rate: [] 82  Resp: [16-22] 20  BP: (102-130)/(58-79) 102/65        Exam:       Visit Vitals   • /65 (BP Location: Right arm, Patient Position: Lying)   • Pulse 82   • Temp 98.4 °F (36.9 °C) (Oral)   • Resp 20   • Ht 73\" (185.4 cm)   • Wt 171 lb 4.8 oz (77.7 kg)   • SpO2 98%   • BMI 22.6 kg/m2         General Appearance:  Alert, cooperative, no distress, appears stated age   Head:  Normocephalic, without obvious abnormality, atraumatic   Eyes:  PERRL, conjunctiva/corneas clear, EOM's intact, both eyes   Ears:  Normal external ear canals, both ears   Nose: Nares normal, septum midline, mucosa normal, no drainage or sinus tenderness   Throat: Lips, mucosa, and tongue normal   Neck: Supple, symmetrical, trachea midline, no adenopathy;   thyroid: no enlargement/tenderness/nodules; no carotid  bruit or JVD   Back:  Symmetric, no curvature, ROM normal, no CVA tenderness "   Lungs:  Clear to auscultation bilaterally, respirations unlabored   Chest Wall:  No tenderness or deformity   Heart:  Regular rate and rhythm, S1 and S2 normal, no murmur, rub or gallop   Abdomen:  Soft, non-tender, bowel sounds active all four quadrants,   no masses, no hepatomegaly, no splenomegaly   Extremities: Extremities normal, atraumatic, no cyanosis or edema. some cellulitis over both arms and perhaps some superficial early abscess changes , he has some tenderness over the lower lumbar spine area    Pulses: 2+ and symmetric all extremities   Skin: Skin color, texture, turgor normal, no rashes or lesions   Lymph nodes: Cervical, supraclavicular, and axillary nodes normal   Neurologic: CNII-XII intact, normal strength, sensation intact throughout       .     Data Review:  Lab Results (last 72 hours)     Procedure Component Value Units Date/Time             Blood Culture [[id number]] Collected: 02/13/17 2043     Specimen: Blood from Arm, Right Updated: 02/13/17 2052     CBC & Differential [[id number]] Collected: 02/13/17 2043     Specimen: Blood Updated: 02/13/17 2101     Narrative:       The following orders were created for panel order CBC & Differential.  Procedure Abnormality Status   --------- ----------- ------   CBC Auto Differential[[id number]] Abnormal Final result      Please view results for these tests on the individual orders.     CBC Auto Differential [[id number]] (Abnormal) Collected: 02/13/17 2043     Specimen: Blood Updated: 02/13/17 2101       WBC 16.68 (H) 10*3/mm3         RBC 4.17 (L) 10*6/mm3         Hemoglobin 13.5 (L) g/dL         Hematocrit 40.7 %         MCV 97.6 (H) fL         MCH 32.4 pg         MCHC 33.2 g/dL         RDW 16.0 (H) %         RDW-SD 57.3 (H) fl         MPV 10.3 fL         Platelets 252 10*3/mm3         Neutrophil % 80.7 (H) %         Lymphocyte % 7.4 (L) %         Monocyte % 11.2 %         Eosinophil % 0.1 (L) %         Basophil % 0.1 %         Immature Grans % 0.5 %          Neutrophils, Absolute 13.46 (H) 10*3/mm3         Lymphocytes, Absolute 1.24 10*3/mm3         Monocytes, Absolute 1.86 (H) 10*3/mm3         Eosinophils, Absolute 0.01 10*3/mm3         Basophils, Absolute 0.02 10*3/mm3         Immature Grans, Absolute 0.09 (H) 10*3/mm3       Lactic Acid, Plasma [[id number]] (Abnormal) Collected: 02/13/17 2043     Specimen: Blood Updated: 02/13/17 2117       Lactate 2.5 (C) mmol/L       Comprehensive Metabolic Panel [[id number]] Collected: 02/13/17 2043     Specimen: Blood Updated: 02/13/17 2123       Glucose 82 mg/dL         BUN 18 mg/dL         Creatinine 1.03 mg/dL         Sodium 139 mmol/L         Potassium 4.1 mmol/L         Chloride 100 mmol/L         CO2 22.2 mmol/L         Calcium 9.3 mg/dL         Total Protein 7.6 g/dL         Albumin 4.00 g/dL         ALT (SGPT) 38 U/L         AST (SGOT) 28 U/L         Alkaline Phosphatase 53 U/L         Total Bilirubin 0.9 mg/dL         eGFR Non African Amer 74 mL/min/1.73         Globulin 3.6 gm/dL         A/G Ratio 1.1 g/dL         BUN/Creatinine Ratio 17.5         Anion Gap 16.8 mmol/L       C-reactive Protein [[id number]] (Abnormal) Collected: 02/13/17 2043     Specimen: Blood Updated: 02/13/17 2219       C-Reactive Protein 5.46 (H) mg/dL       Sedimentation Rate [[id number]] (Abnormal) Collected: 02/13/17 2043     Specimen: Blood Updated: 02/13/17 2308       Sed Rate 37 (H) mm/hr       Urine Culture [[id number]] Collected: 02/13/17 2257     Specimen: Urine from Urine, Clean Catch Updated: 02/13/17 2317     Urinalysis With / Culture If Indicated [[id number]] (Abnormal) Collected: 02/13/17 2257     Specimen: Urine from Urine, Clean Catch Updated: 02/13/17 2325       Color, UA Dark Yellow (A)         Appearance, UA Cloudy (A)         pH, UA 6.0         Specific Gravity, UA >=1.030         Glucose, UA Negative         Ketones, UA 40 mg/dL (2+) (A)         Bilirubin, UA Negative         Blood, UA Negative         Protein, UA 30 mg/dL (1+) (A)          Leuk Esterase, UA Small (1+) (A)         Nitrite, UA Negative         Urobilinogen, UA 1.0 E.U./dL       Urinalysis, Microscopic Only [[id number]] (Abnormal) Collected: 02/13/17 2257     Specimen: Urine from Urine, Clean Catch Updated: 02/13/17 2334       RBC, UA 6-12 (A) /HPF         WBC, UA 3-5 (A) /HPF         Bacteria, UA None Seen /HPF         Squamous Epithelial Cells, UA 0-2 /HPF         Hyaline Casts, UA 21-30 /LPF         Methodology Manual Light Microscopy       Lactate Acid, Reflex [[id number]] (Normal) Collected: 02/14/17 0056     Specimen: Blood Updated: 02/14/17 0126       Lactate 0.7 mmol/L                     Imaging Results (all)     Procedure Component Value Units Date/Time     CT Abdomen Pelvis Without Contrast [[id number]] Collected: 02/13/17 2125       Updated: 02/13/17 2125     Narrative:       CT ABDOMEN AND PELVIS WITHOUT CONTRAST.      TECHNIQUE: A routine CT scan of the abdomen and pelvis was performed  with coronal and sagittal reconstructed images.      HISTORY: Left flank pain.      COMPARISON: No prior studies for comparison.      FINDINGS:  Lung bases demonstrate no consolidation or effusion. Small hiatal  hernia.       Liver demonstrates homogeneous parenchyma, no definite mass.  Unremarkable gallbladder. No intra or extrahepatic biliary ductal  dilatation.       The spleen is unremarkable. Pancreas is unremarkable, no pancreatic  ductal dilatation. Adrenal glands are within normal limits.      Kidneys do not demonstrate hydronephrosis. No definite renal calculi.  3.6 cm left renal cyst. Scarring at the upper pole left kidney and  cortical calcifications measuring up to 0.6 cm. Urinary bladder is  unremarkable.       Small and large bowel loops are within normal limits. Appendix is not  clearly visualized. Moderately large amount stool in the colon. 1.4 cm  umbilical hernia contains fat.      No significant retroperitoneal lymphadenopathy.              Impression:       1. No definite acute  abdominal pathology, no obstructive uropathy or  inflammatory bowel disease.   2. 3.6 cm left renal cyst. Scarring in the upper pole left kidney with  cortical calcifications measuring up to 0.6 cm. No hydronephrosis.  Please note that urinary tract infection/pyelonephritis cannot be  excluded on noncontrast examination.  3. Appendix is not visualized. Moderately large amount of stool in the  colon, patient may be constipated.  4. If indicated further evaluation with contrast-enhanced CT scan may  be performed.                      Patient Active Problem List   Diagnosis Code   • Sepsis A41.9   • Low back pain M54.5   • Drug abuse, IVDA cocaine F19.10   • Cellulitis of upper extremity L03.119         Assessment:  Principal Problem:  Sepsis  Active Problems:  Low back pain  Drug abuse, IVDA cocaine  Cellulitis of upper extremity        Plan:  The patient's admitted the hospital and cultures of been obtained. The patient will be started on broad-spectrum IV antibiotics and will consult infectious disease. We'll to obtain MRI of the lumbar spine and echocardiogram. Will ask access Center to see patient about his drug abuse.     Axel De Jesus MD  2/14/2017  5:26 AM             Physician Progress Notes (last 24 hours) (Notes from 2/13/2017 11:11 AM through 2/14/2017 11:11 AM)      Dwight Rivera MD at 2/14/2017  8:28 AM  Version 1 of 1         Seen this am by Dr De Jesus on the night service.    vss    D/w Dr Faustin and he recommends Stat MRI,  The Mri has been changed to stat with and without.  Also Dr Faustin recommends surgery consult for his hand and arm so consulted Dr Sousa.     Electronically signed by Dwight Rivera MD at 2/14/2017  8:30 AM           Consult Notes (last 24 hours) (Notes from 2/13/2017 11:11 AM through 2/14/2017 11:11 AM)      Vladimir Faustin MD at 2/14/2017  7:56 AM  Version 1 of 1     Consult Orders:    1. Inpatient Consult to Infectious Diseases [[id number]] ordered by Axel PENA  "MD Liza at 02/14/17 0524                Referring Provider: Axel De Jesus MD  [address] [place] [address]    Reason for Consultation: fever IVDA and severe back pain    History of present illness:  Tre is a 61 YOM who I am asked to evaluate and give opinion for \"fever IVDA and severe back pain.\" History isobtained from the patient and review of the medical records which I summarize/synthesize as follows: He presented to the ER on 2/13 with sudden onset sharp lower back pain that radiated to the flanks. He had never had anything like this before. He had trouble walking. He had associated fevers and chills. He also reported abscesses on on his rightthumb and left AC fossa. These were sites where he had injected cocaine a few days prior. He had been sober for a while. In the ER he was febrile to 101.4 F with HR 86 and /72. Labs were notable for WBC 16.5, CRP 5.4 and LA 2.5.  MRI has been ordered but not yet done. He has no hardware present in his body.    PMH:  Cocaine abuse  EtOH abuse    PSH:  Appy    Social History:  Cocaine abuse  EtOH abuse    Family History:  NO family history of back infections    Allergies:  NKDA    Medications:    Current Facility-Administered Medications:   •  cefTRIAXone (ROCEPHIN) IVPB 2 g, 2 g, Intravenous, Q12H, Axel De Jesus MD  •  HYDROmorphone (DILAUDID) injection 0.5 mg, 0.5 mg, Intravenous, Q2H PRN, Axel De Jesus MD, 0.5 mg at 02/14/17 0541  •  LORazepam (ATIVAN) injection 1 mg, 1 mg, Intravenous, Q4H PRN, Axel De Jesus MD  •  melatonin tablet 5 mg, 5 mg, Oral, Nightly, Axel De Jesus MD  •  ondansetron (ZOFRAN) injection 4 mg, 4 mg, Intravenous, Q6H PRN, Axel De Jesus MD  •  Pharmacy to dose vancomycin, , Does not apply, Continuous PRN, Axel De Jesus MD  •  Insert peripheral IV, , , Once **AND** sodium chloride 0.9 % flush 10 mL, 10 mL, Intravenous, PRN, Byron Kellie Cotton III, PA  •  vancomycin 1500 mg/500 mL 0.9% NS IVPB ([facility]), 1,500 mg, Intravenous, " Q12H, Axel De Jesus MD  •  vancomycin 2000 mg/500 mL 0.9% NS IVPB ([facility]), 2,000 mg, Intravenous, Once, Axel De Jesus MD      Review of Systems  All systems were reviewed and are negative unless otherwise stated above in the HPI    Objective   Vital Signs   Temp:  [97.7 °F (36.5 °C)-101.4 °F (38.6 °C)] 98.4 °F (36.9 °C)  Heart Rate:  [] 82  Resp:  [16-22] 20  BP: (102-130)/(58-79) 102/65    Physical Exam:   General: awake, alert, in pain due to back  Head: Normocephalic, atraumatic  Eyes: PERRL, EOMI, no scleral icterus, no conjunctival pallor, no conjunctival hemorrhages.   ENT: MMM, OP clear, no thrush. Good dentition.   Neck: Supple, trachea is midline  Cardiovascular: NR, RR, no murmurs, rubs, or gallops;no LE edema  Respiratory: Lungs are clear to ascultation bilaterally, no rales or wheezing; normal work of breathing on ambient air   GI: Abdomen is soft, non-tender, non-distended, normal bowel sounds in all four quadrants; no hepatosplenomegaly, no masses palpated  : no Cai catheter present  Musculoskeletal: L AC fossa abscess, R thumb erythema, swelling, and limited ROM  Skin: No rashes, lesions, or embolic phenomenon  Neurological: Alert and oriented x 3,  motor strength 5/5 in all four extremities  Psychiatric: Normal mood and affect   Lymph: no pre-auricular, post-auricular, submandibular, cervical, supraclavicular LAD  Vasc: no cyanosis; PIV w/o erythema    Labs:     Lab Results   Component Value Date    WBC 14.47 (H) 02/14/2017    HGB 11.9 (L) 02/14/2017    HCT 36.7 (L) 02/14/2017    MCV 98.9 (H) 02/14/2017     02/14/2017       Lab Results   Component Value Date    GLUCOSE 87 02/14/2017    BUN 17 02/14/2017    CREATININE 0.86 02/14/2017    EGFRIFNONA 91 02/14/2017    EGFRIFAFRI  09/20/2016      Comment:      <15 Indicative of kidney failure.    BCR 19.8 02/14/2017    CO2 21.8 (L) 02/14/2017    CALCIUM 8.4 (L) 02/14/2017    ALBUMIN 4.00 02/13/2017    LABIL2 1.1 02/13/2017    AST 28  02/13/2017    ALT 38 02/13/2017     Lab Results   Component Value Date    CRP 5.46 (H) 02/13/2017     3-5 WBCs    Microbiology:  2/13 BCx: pending  2/13 UCx: pending    Radiology (personally reviewed):  CT A/P negative for acute pathology; pyelo not seen but can't be excluded on non-con per radiologist    Assessment&#47;Plan   1. Fever and back pain in IV cocaine abuser  -MRI is imperative; it has been ordered and primary team called radiology to try to expedite; that being said, he currently has 5/5 LE strength  -f/u blood cultures  -agree w/ TTE  -continue empiric vancomycin with goal trough 15-20  -continue empiric ceftriaxone 2 g IV but change frequency to q24h  -check HIV and Hep B/C    2. R thumb infection and L AC fossa abscess  -consult placed to hand surgery Dr Sousa    3. EtOH abuse - monitor for signs of withdrawal    Thank you for this consult. ID will follow. I discussed the patients findings and my recommendations with Dr Rivera.       Electronically signed by Vladimir Faustin MD at 2/14/2017  8:36 AM

## 2017-02-14 NOTE — PROGRESS NOTES
"Pharmacokinetic Consult - Vancomycin Dosing (Initial Note)    Tre Owens has a consult for pharmacy to dose vancomycin for sepsis.  Pharmacy dosing vancomycin per Dr. De Jesus's request.   Goal trough: 15-20 mg/L       Relevant clinical data and objective history reviewed:  61 y.o. male 73\" (185.4 cm) 171 lb 4.8 oz (77.7 kg)    Past Medical History   Diagnosis Date   • Alcoholism    • Kidney failure      CREATININE   Date Value Ref Range Status   02/13/2017 1.03 0.76 - 1.27 mg/dL Final     BUN   Date Value Ref Range Status   02/13/2017 18 8 - 23 mg/dL Final     Estimated Creatinine Clearance: 83.8 mL/min (by C-G formula based on Cr of 1.03).    Lab Results   Component Value Date    WBC 16.68 (H) 02/13/2017     Temp Readings from Last 3 Encounters:   02/14/17 98.4 °F (36.9 °C) (Oral)   09/20/16 98.3 °F (36.8 °C) (Tympanic)          Assessment/Plan  Will start vancomycin 2000 mg IV x1, then Vanco 1500mg IV q12h. Vancomycin level to be drawn 1730 2/15. Pharmacy will continue to follow daily while on vancomycin and adjust as needed.     Cassie Craft [facility]    "

## 2017-02-14 NOTE — PLAN OF CARE
Problem: Patient Care Overview (Adult)  Goal: Plan of Care Review  Outcome: Ongoing (interventions implemented as appropriate)    02/14/17 0109   Coping/Psychosocial Response Interventions   Plan Of Care Reviewed With patient   Patient Care Overview   Progress progress toward functional goals is gradual   Outcome Evaluation   Outcome Summary/Follow up Plan Recieved patient from ER. Waiting on MD orders.         Problem: Pain, Acute (Adult)  Goal: Identify Related Risk Factors and Signs and Symptoms  Outcome: Ongoing (interventions implemented as appropriate)  Goal: Acceptable Pain Control/Comfort Level  Outcome: Ongoing (interventions implemented as appropriate)    Problem: Cellulitis (Adult)  Goal: Signs and Symptoms of Listed Potential Problems Will be Absent or Manageable (Cellulitis)  Outcome: Ongoing (interventions implemented as appropriate)

## 2017-02-14 NOTE — ED NOTES
EMS reports patient complains of bilateral flank pain since last night with painful urinated. Patient also complains of bilateral abscesses in his AC d/t IV drug abuse.      Selina Robins RN  02/13/17 1952

## 2017-02-14 NOTE — PROGRESS NOTES
Seen this am by Dr De Jesus on the night service.    vss    D/w Dr Faustin and he recommends Stat MRI,  The Mri has been changed to stat with and without.  Also Dr Faustin recommends surgery consult for his hand and arm so consulted Dr Sousa.

## 2017-02-14 NOTE — PROGRESS NOTES
Discharge Planning Assessment  [facility]     Patient Name: Tre Owens  MRN: [id number]  Today's Date: 2/14/2017    Admit Date: 2/13/2017          Discharge Needs Assessment       02/14/17 1058    Living Environment    Lives With alone    Living Arrangements house    Provides Primary Care For no one    Quality Of Family Relationships supportive    Able to Return to Prior Living Arrangements yes    Discharge Needs Assessment    Concerns To Be Addressed substance/tobacco abuse/use concerns    Readmission Within The Last 30 Days no previous admission in last 30 days    Anticipated Changes Related to Illness none    Equipment Currently Used at Home none    Equipment Needed After Discharge none    Transportation Available car;family or friend will provide            Discharge Plan       02/14/17 1101    Case Management/Social Work Plan    Plan Home- has family that lives near him    Patient/Family In Agreement With Plan yes    Additional Comments Introduced self and explained role of CCP and facesheet verified with patient who is alert and oriented x 4.  Patient states he is independent with ADLs at home and uses no DME currently.  Patient states he lives alone but has family that lives near him for support.  Patient states he uses [facility] pharmacy at [address] and denies any issues affording or obtaining medications.  Patient states he plans on returning home at discharge and denies need for home health or any equipment at discharge.  Access Center to see patient. CCP will continue to follow....Emily Celis RN,CCP        Discharge Placement     No information found                Demographic Summary       02/14/17 1056    Referral Information    Admission Type inpatient    Arrived From home or self-care    Contact Information    Permission Granted to Share Information With family/designee   Alverto Myrick(sister) [phone]    Primary Care Physician Information    Name Kishor Camacho MD             Functional Status       02/14/17 1057    Functional Status Current    Ambulation 2-->assistive person    Transferring 2-->assistive person    Toileting 0-->independent    Bathing 0-->independent    Dressing 0-->independent    Eating 0-->independent    Communication 0-->understands/communicates without difficulty    Change in Functional Status Since Onset of Current Illness/Injury yes    Functional Status Prior    Ambulation 0-->independent    Transferring 0-->independent    Toileting 0-->independent    Bathing 0-->independent    Dressing 0-->independent    Eating 0-->independent    Communication 0-->understands/communicates without difficulty    IADL    Medications independent    Meal Preparation independent    Housekeeping independent    Laundry independent    Shopping independent    Oral Care independent    Activity Tolerance    Current Activity Limitations none    Usual Activity Tolerance excellent    Current Activity Tolerance moderate    Cognitive/Perceptual/Developmental    Current Mental Status/Cognitive Functioning no deficits noted    Recent Changes in Mental Status/Cognitive Functioning no changes    Developmental Stage (Eriksson's Stages of Development) Stage 7 (35-65 years/Middle Adulthood) Generativity vs. Stagnation            Psychosocial     None            Abuse/Neglect     None            Legal     None            Substance Abuse     None            Patient Forms     None          Emily Celis, RN

## 2017-02-14 NOTE — CONSULTS
Patient Care Team:  Kishor Camacho MD as PCP - General  Kishor Camacho MD as PCP - Family Medicine    Chief complaint   Pain and swelling over antecubital fossa of left elbow, rendness and swelling over dorsal right thumb for 3 days.    History of present illness:   The patient is a 61-year-old right-handed dominant gentleman who is admitted to hospital yesterday under the impression of abscess formation from IV drug abuse. According to patient, he has been using cocaine on a regular basis.  About 3-4 days ago, he noticed there was increased redness followed by induration on the volar aspect of left elbow.  The condition continued to get worse and extended to midforearm area.  In addition, he also notices redness and pain on dorsal side of right thumb and right antecubital fossa area.  He was then admitted to hospital since yesterday.  He has been receiving IV vancomycin and Rocephin.  He feels he is seeing some improvement from the infection since admission.  He denied any subjective fever or chills.  He denied any axilla or epitrochlear pain.    Review of Systems  CONSTITUTIONAL: As per HPI.   HEENT: Eyes: No diplopia or blurred vision. ENT: No earache, sore throat or runny nose.   CARDIOVASCULAR: No pressure, squeezing, strangling, tightness, heaviness or aching about the chest, neck, axilla or epigastrium.   RESPIRATORY: No cough, shortness of breath, PND or orthopnea.   GASTROINTESTINAL: No nausea, vomiting or diarrhea.   GENITOURINARY: No dysuria, frequency or urgency.   MUSCULOSKELETAL: As per HPI.   SKIN: No change in skin, hair or nails.   NEUROLOGIC: No paresthesias, fasciculations, seizures or weakness.   PSYCHIATRIC: No disorder of thought or mood.   ENDOCRINE: No heat or cold intolerance, polyuria or polydipsia.   HEMATOLOGICAL: No easy bruising or  bleeding.       Past Medical History   Diagnosis Date   • Alcoholism    • Kidney failure      Past Surgical History   Procedure Laterality Date   • Appendectomy       Family History   Problem Relation Age of Onset   • Alcohol abuse Father      Social History   Substance Use Topics   • Smoking status: Never Smoker   • Smokeless tobacco: None   • Alcohol use No     Prescriptions Prior to Admission   Medication Sig Dispense Refill Last Dose   • melatonin 5 MG tablet tablet Take 5 mg by mouth Every Night.      • Multiple Vitamins-Minerals (MULTIVITAMIN ADULT PO) Take 1 tablet by mouth Daily.      • TESTOSTERONE IM Inject  into the shoulder, thigh, or buttocks 3 (Three) Times a Week.          [START ON 2/15/2017] ceftriaxone 2 g Intravenous Q24H   melatonin 5 mg Oral Nightly   vancomycin 1,500 mg Intravenous Q12H     Allergies:   Review of patient's allergies indicates no known allergies.      Vital Signs   Temp:  [97.7 °F (36.5 °C)-101.4 °F (38.6 °C)] 98.9 °F (37.2 °C)  Heart Rate:  [] 107  Resp:  [16-22] 20  BP: (102-130)/(58-79) 117/75      Physical Exam:     General Appearance:    Alert, cooperative, in no acute distress   Head:    Normocephalic, without obvious abnormality, atraumatic   Eyes:            Lids and lashes normal, conjunctivae and sclerae normal, no   icterus, no pallor, corneas clear, PERRLA   Ears:    Ears appear intact with no abnormalities noted   Throat:   No oral lesions, no thrush, oral mucosa moist   Neck:   No adenopathy, supple, trachea midline, no thyromegaly, no   carotid bruit, no JVD   Back:     No kyphosis present, no scoliosis present, no skin lesions,      erythema or scars, no tenderness to percussion or                   palpation,   range of motion normal   Lungs:     Clear to auscultation,respirations regular, even and                  unlabored    Heart:    Regular rhythm and normal rate, normal S1 and S2, no            murmur, no gallop, no rub, no click   Abdomen:      Normal bowel sounds, no masses, no organomegaly, soft        non-tender, non-distended, no guarding, no rebound                tenderness   Rectal:     Deferred   Extremities:   Moves all extremities well, no edema, no cyanosis, no             redness    BUE: viable with good blanching and capillary refills;  LUE: an area of induration 2x3cm on volar aspect of left elbow just distal to AC area. Moderate tenderness and mild redness; no fluid fluctuation; ROM is full; no NV deficit.  RUE: redness, swelling on dorsal R thumb mainly MC area; no skin lesion or fluid fluctuation; (+) local tenderness; ROM of R thumb is near full; no increase of pain on resisted thumb extension; no NV deficit; on AC area: some scattered scabs without any sign of active infection.       Results Review:   I reviewed the patient's new clinical results.  Results for OLENA PARKER REX (MRN [id number]) as of 2/14/2017 13:19   Ref. Range 2/13/2017 20:43 2/14/2017 05:52   WBC Latest Ref Range: 4.50 - 10.70 10*3/mm3 16.68 (H) 14.47 (H)   RBC Latest Ref Range: 4.60 - 6.00 10*6/mm3 4.17 (L) 3.71 (L)   Hemoglobin Latest Ref Range: 13.7 - 17.6 g/dL 13.5 (L) 11.9 (L)   Hematocrit Latest Ref Range: 40.4 - 52.2 % 40.7 36.7 (L)   RDW Latest Ref Range: 11.5 - 14.5 % 16.0 (H) 16.4 (H)   MCV Latest Ref Range: 79.8 - 96.2 fL 97.6 (H) 98.9 (H)   MCH Latest Ref Range: 27.0 - 32.7 pg 32.4 32.1   MCHC Latest Ref Range: 32.6 - 36.4 g/dL 33.2 32.4 (L)   MPV Latest Ref Range: 6.0 - 12.0 fL 10.3 10.0   Platelets Latest Ref Range: 140 - 500 10*3/mm3 252 257   RDW-SD Latest Ref Range: 37.0 - 54.0 fl 57.3 (H) 59.5 (H)   Neutrophil % Latest Ref Range: 42.7 - 76.0 % 80.7 (H) 80.4 (H)   Lymphocyte % Latest Ref Range: 19.6 - 45.3 % 7.4 (L) 10.4 (L)   Monocyte % Latest Ref Range: 5.0 - 12.0 % 11.2 8.6   Eosinophil % Latest Ref Range: 0.3 - 6.2 % 0.1 (L) 0.1 (L)   Basophil % Latest Ref Range: 0.0 - 1.5 % 0.1 0.1   Immature Grans % Latest Ref Range: 0.0 - 0.5 % 0.5 0.4    Neutrophils, Absolute Latest Ref Range: 1.90 - 8.10 10*3/mm3 13.46 (H) 11.61 (H)   Lymphocytes, Absolute Latest Ref Range: 0.90 - 4.80 10*3/mm3 1.24 1.51   Monocytes, Absolute Latest Ref Range: 0.20 - 1.20 10*3/mm3 1.86 (H) 1.25 (H)   Eosinophils, Absolute Latest Ref Range: 0.00 - 0.70 10*3/mm3 0.01 0.02   Basophils, Absolute Latest Ref Range: 0.00 - 0.20 10*3/mm3 0.02 0.02   Immature Grans, Absolute Latest Ref Range: 0.00 - 0.03 10*3/mm3 0.09 (H) 0.06 (H)   Sed Rate Latest Ref Range: 0 - 20 mm/hr 37 (H)        Principal Problem:    Sepsis  Active Problems:    Low back pain    Drug abuse, IVDA cocaine    Cellulitis of upper extremity      Impression:  1. IVDA with fluid collection over L AC area, more in favor of abscess formation; Right dorsal thumb cellulitis.    Plans:  1. Although I feel the induration over L AC is abscess formation, patient feels he is getter better overall. Very likely, he will still need surgery for I/D. However, I would like to obtain US of L AC area to confirm it. No NPO at this moment.  2. For R thumb cellulitis, will recommend continue IV Abx.  3. Will continue to follow.    Chevy Sousa MD  02/14/17  12:52 PM

## 2017-02-14 NOTE — ED NOTES
Pt c/o bilateral flank pain that has been going on all weekend although worsened tonight. He has no hx of kidney stones; he believes it is a kidney infection. Pt does appear in pain. Reassurance given; call light in reach. Pts breathing even and unlabored.       Kelsy Willett RN  02/13/17 1009

## 2017-02-14 NOTE — CONSULTS
"Referring Provider: Axel De Jesus MD  [address] [place] [address]    Reason for Consultation: fever IVDA and severe back pain    History of present illness:  Tre is a 61 YOM who I am asked to evaluate and give opinion for \"fever IVDA and severe back pain.\" History isobtained from the patient and review of the medical records which I summarize/synthesize as follows: He presented to the ER on 2/13 with sudden onset sharp lower back pain that radiated to the flanks. He had never had anything like this before. He had trouble walking. He had associated fevers and chills. He also reported abscesses on on his rightthumb and left AC fossa. These were sites where he had injected cocaine a few days prior. He had been sober for a while. In the ER he was febrile to 101.4 F with HR 86 and /72. Labs were notable for WBC 16.5, CRP 5.4 and LA 2.5.  MRI has been ordered but not yet done. He has no hardware present in his body.    PMH:  Cocaine abuse  EtOH abuse    PSH:  Appy    Social History:  Cocaine abuse  EtOH abuse    Family History:  NO family history of back infections    Allergies:  NKDA    Medications:    Current Facility-Administered Medications:   •  cefTRIAXone (ROCEPHIN) IVPB 2 g, 2 g, Intravenous, Q12H, Axel De Jesus MD  •  HYDROmorphone (DILAUDID) injection 0.5 mg, 0.5 mg, Intravenous, Q2H PRN, Axel De Jesus MD, 0.5 mg at 02/14/17 0541  •  LORazepam (ATIVAN) injection 1 mg, 1 mg, Intravenous, Q4H PRN, Axel De Jesus MD  •  melatonin tablet 5 mg, 5 mg, Oral, Nightly, Axel De Jesus MD  •  ondansetron (ZOFRAN) injection 4 mg, 4 mg, Intravenous, Q6H PRN, Axel De Jesus MD  •  Pharmacy to dose vancomycin, , Does not apply, Continuous PRN, Axel De Jesus MD  •  Insert peripheral IV, , , Once **AND** sodium chloride 0.9 % flush 10 mL, 10 mL, Intravenous, PRN, MIKEY Bradley III  •  vancomycin 1500 mg/500 mL 0.9% NS IVPB ([facility]), 1,500 mg, Intravenous, Q12H, Axel De Jesus MD  •  vancomycin 2000 " mg/500 mL 0.9% NS IVPB ([facility]), 2,000 mg, Intravenous, Once, Axel De Jesus MD      Review of Systems  All systems were reviewed and are negative unless otherwise stated above in the HPI    Objective   Vital Signs   Temp:  [97.7 °F (36.5 °C)-101.4 °F (38.6 °C)] 98.4 °F (36.9 °C)  Heart Rate:  [] 82  Resp:  [16-22] 20  BP: (102-130)/(58-79) 102/65    Physical Exam:   General: awake, alert, in pain due to back  Head: Normocephalic, atraumatic  Eyes: PERRL, EOMI, no scleral icterus, no conjunctival pallor, no conjunctival hemorrhages.   ENT: MMM, OP clear, no thrush. Good dentition.   Neck: Supple, trachea is midline  Cardiovascular: NR, RR, no murmurs, rubs, or gallops;no LE edema  Respiratory: Lungs are clear to ascultation bilaterally, no rales or wheezing; normal work of breathing on ambient air   GI: Abdomen is soft, non-tender, non-distended, normal bowel sounds in all four quadrants; no hepatosplenomegaly, no masses palpated  : no Cai catheter present  Musculoskeletal: L AC fossa abscess, R thumb erythema, swelling, and limited ROM  Skin: No rashes, lesions, or embolic phenomenon  Neurological: Alert and oriented x 3,  motor strength 5/5 in all four extremities  Psychiatric: Normal mood and affect   Lymph: no pre-auricular, post-auricular, submandibular, cervical, supraclavicular LAD  Vasc: no cyanosis; PIV w/o erythema    Labs:     Lab Results   Component Value Date    WBC 14.47 (H) 02/14/2017    HGB 11.9 (L) 02/14/2017    HCT 36.7 (L) 02/14/2017    MCV 98.9 (H) 02/14/2017     02/14/2017       Lab Results   Component Value Date    GLUCOSE 87 02/14/2017    BUN 17 02/14/2017    CREATININE 0.86 02/14/2017    EGFRIFNONA 91 02/14/2017    EGFRIFAFRI  09/20/2016      Comment:      <15 Indicative of kidney failure.    BCR 19.8 02/14/2017    CO2 21.8 (L) 02/14/2017    CALCIUM 8.4 (L) 02/14/2017    ALBUMIN 4.00 02/13/2017    LABIL2 1.1 02/13/2017    AST 28 02/13/2017    ALT 38 02/13/2017     Lab Results    Component Value Date    CRP 5.46 (H) 02/13/2017       UA 3-5 WBCs    Microbiology:  2/13 BCx: pending  2/13 UCx: pending    Radiology (personally reviewed):  CT A/P negative for acute pathology; pyelo not seen but can't be excluded on non-con per radiologist    Assessment/Plan   1. Fever and back pain in IV cocaine abuser  -MRI is imperative; it has been ordered and primary team called radiology to try to expedite; that being said, he currently has 5/5 LE strength  -f/u blood cultures  -agree w/ TTE  -continue empiric vancomycin with goal trough 15-20  -continue empiric ceftriaxone 2 g IV but change frequency to q24h  -check HIV and Hep B/C    2. R thumb infection and L AC fossa abscess  -consult placed to hand surgery Dr Sousa    3. EtOH abuse - monitor for signs of withdrawal    Thank you for this consult. ID will follow. I discussed the patients findings and my recommendations with Dr Rivera.

## 2017-02-14 NOTE — H&P
HISTORY AND PHYSICAL   [facility]        Patient Identification:  Name: Tre Owens  Age: 61 y.o.  Sex: male  :  1955  MRN: [id number]                     Primary Care Physician: Kishor Camacho MD    Chief Complaint:  Severe back pain and fever    History of Present Illness:         The patient is a 61-year-old white male with history of IV drug abuse with cocaine who is admitted with a history of severe back pain which is gotten worse over the last couple of days.  He's been having fever and chills and having difficulty walking.  He's had a little bit of nausea but no vomiting.  He denies any chest pain or shortness of air.  He's been injecting himself with cocaine in both arms and has some cellulitis and may be superficial abscesses in both arms.  The patient was evaluated in the emergency room and admitted for further evaluation treatment with concern for possible infection in the lumbar spine epidural space.  The patient started on some broad-spectrum antibiotics and admitted for further evaluation treatment.    Past Medical History:  Past Medical History   Diagnosis Date   • Alcoholism    • Kidney failure      Past Surgical History:  Past Surgical History   Procedure Laterality Date   • Appendectomy        Home Meds:  Prescriptions Prior to Admission   Medication Sig Dispense Refill Last Dose   • melatonin 5 MG tablet tablet Take 5 mg by mouth Every Night.      • Multiple Vitamins-Minerals (MULTIVITAMIN ADULT PO) Take 1 tablet by mouth Daily.      • TESTOSTERONE IM Inject  into the shoulder, thigh, or buttocks 3 (Three) Times a Week.          Allergies:  No Known Allergies  Immunizations:    There is no immunization history on file for this patient.  Social History:   Social History     Social History Narrative     Social History     Social History   • Marital status: Single     Spouse name: N/A   • Number of children: N/A   • Years of education: N/A     Occupational History   •  "property management      Social History Main Topics   • Smoking status: Never Smoker   • Smokeless tobacco: Not on file   • Alcohol use No   • Drug use: Yes     Special: Cocaine   • Sexual activity: Not on file     Other Topics Concern   • Not on file     Social History Narrative       Family History:  Family History   Problem Relation Age of Onset   • Alcohol abuse Father         Review of Systems  See history of present illness and past medical history.  Patient denies headache, dizziness, syncope, falls, trauma, change in vision, change in hearing, change in taste, changes in weight, changes in appetite, focal weakness, numbness, or paresthesia.  Patient denies chest pain, palpitations, dyspnea, orthopnea, PND, cough, sinus pressure, rhinorrhea, epistaxis, hemoptysis,  vomiting,hematemesis, diarrhea, constipation or hematchezia.  Denies cold or heat intolerance, polydipsia, polyuria, polyphagia. Denies hematuria, pyuria, dysuria, hesitancy, frequency or urgency.     Remainder of ROS is negative.    Objective:  tMax 24 hrs: Temp (24hrs), Av.2 °F (37.3 °C), Min:97.7 °F (36.5 °C), Max:101.4 °F (38.6 °C)    Vitals Ranges:   Temp:  [97.7 °F (36.5 °C)-101.4 °F (38.6 °C)] 98.4 °F (36.9 °C)  Heart Rate:  [] 82  Resp:  [16-22] 20  BP: (102-130)/(58-79) 102/65      Exam:  Visit Vitals   • /65 (BP Location: Right arm, Patient Position: Lying)   • Pulse 82   • Temp 98.4 °F (36.9 °C) (Oral)   • Resp 20   • Ht 73\" (185.4 cm)   • Wt 171 lb 4.8 oz (77.7 kg)   • SpO2 98%   • BMI 22.6 kg/m2       General Appearance:    Alert, cooperative, no distress, appears stated age   Head:    Normocephalic, without obvious abnormality, atraumatic   Eyes:    PERRL, conjunctiva/corneas clear, EOM's intact, both eyes   Ears:    Normal external ear canals, both ears   Nose:   Nares normal, septum midline, mucosa normal, no drainage    or sinus tenderness   Throat:   Lips, mucosa, and tongue normal   Neck:   Supple, symmetrical, " trachea midline, no adenopathy;     thyroid:  no enlargement/tenderness/nodules; no carotid    bruit or JVD   Back:     Symmetric, no curvature, ROM normal, no CVA tenderness   Lungs:     Clear to auscultation bilaterally, respirations unlabored   Chest Wall:    No tenderness or deformity    Heart:    Regular rate and rhythm, S1 and S2 normal, no murmur, rub   or gallop   Abdomen:     Soft, non-tender, bowel sounds active all four quadrants,     no masses, no hepatomegaly, no splenomegaly   Extremities:   Extremities normal, atraumatic, no cyanosis or edema. some cellulitis over both arms and perhaps some superficial early abscess changes , he has some tenderness over the lower lumbar spine area    Pulses:   2+ and symmetric all extremities   Skin:   Skin color, texture, turgor normal, no rashes or lesions   Lymph nodes:   Cervical, supraclavicular, and axillary nodes normal   Neurologic:   CNII-XII intact, normal strength, sensation intact throughout      .    Data Review:  Lab Results (last 72 hours)     Procedure Component Value Units Date/Time    Blood Culture [[id number]] Collected:  02/13/17 2043    Specimen:  Blood from Arm, Right Updated:  02/13/17 2052    CBC & Differential [[id number]] Collected:  02/13/17 2043    Specimen:  Blood Updated:  02/13/17 2101    Narrative:       The following orders were created for panel order CBC & Differential.  Procedure                               Abnormality         Status                     ---------                               -----------         ------                     CBC Auto Differential[[id number]]         Abnormal            Final result                 Please view results for these tests on the individual orders.    CBC Auto Differential [[id number]]  (Abnormal) Collected:  02/13/17 2043    Specimen:  Blood Updated:  02/13/17 2101     WBC 16.68 (H) 10*3/mm3      RBC 4.17 (L) 10*6/mm3      Hemoglobin 13.5 (L) g/dL      Hematocrit 40.7 %      MCV 97.6 (H) fL       MCH 32.4 pg      MCHC 33.2 g/dL      RDW 16.0 (H) %      RDW-SD 57.3 (H) fl      MPV 10.3 fL      Platelets 252 10*3/mm3      Neutrophil % 80.7 (H) %      Lymphocyte % 7.4 (L) %      Monocyte % 11.2 %      Eosinophil % 0.1 (L) %      Basophil % 0.1 %      Immature Grans % 0.5 %      Neutrophils, Absolute 13.46 (H) 10*3/mm3      Lymphocytes, Absolute 1.24 10*3/mm3      Monocytes, Absolute 1.86 (H) 10*3/mm3      Eosinophils, Absolute 0.01 10*3/mm3      Basophils, Absolute 0.02 10*3/mm3      Immature Grans, Absolute 0.09 (H) 10*3/mm3     Lactic Acid, Plasma [[id number]]  (Abnormal) Collected:  02/13/17 2043    Specimen:  Blood Updated:  02/13/17 2117     Lactate 2.5 (C) mmol/L     Comprehensive Metabolic Panel [[id number]] Collected:  02/13/17 2043    Specimen:  Blood Updated:  02/13/17 2123     Glucose 82 mg/dL      BUN 18 mg/dL      Creatinine 1.03 mg/dL      Sodium 139 mmol/L      Potassium 4.1 mmol/L      Chloride 100 mmol/L      CO2 22.2 mmol/L      Calcium 9.3 mg/dL      Total Protein 7.6 g/dL      Albumin 4.00 g/dL      ALT (SGPT) 38 U/L      AST (SGOT) 28 U/L      Alkaline Phosphatase 53 U/L      Total Bilirubin 0.9 mg/dL      eGFR Non African Amer 74 mL/min/1.73      Globulin 3.6 gm/dL      A/G Ratio 1.1 g/dL      BUN/Creatinine Ratio 17.5      Anion Gap 16.8 mmol/L     C-reactive Protein [[id number]]  (Abnormal) Collected:  02/13/17 2043    Specimen:  Blood Updated:  02/13/17 2219     C-Reactive Protein 5.46 (H) mg/dL     Sedimentation Rate [[id number]]  (Abnormal) Collected:  02/13/17 2043    Specimen:  Blood Updated:  02/13/17 2308     Sed Rate 37 (H) mm/hr     Urine Culture [[id number]] Collected:  02/13/17 2257    Specimen:  Urine from Urine, Clean Catch Updated:  02/13/17 2317    Urinalysis With / Culture If Indicated [[id number]]  (Abnormal) Collected:  02/13/17 2257    Specimen:  Urine from Urine, Clean Catch Updated:  02/13/17 2320     Color, UA Dark Yellow (A)      Appearance, UA Cloudy (A)      pH, UA 6.0       Specific Gravity, UA >=1.030      Glucose, UA Negative      Ketones, UA 40 mg/dL (2+) (A)      Bilirubin, UA Negative      Blood, UA Negative      Protein, UA 30 mg/dL (1+) (A)      Leuk Esterase, UA Small (1+) (A)      Nitrite, UA Negative      Urobilinogen, UA 1.0 E.U./dL     Urinalysis, Microscopic Only [[id number]]  (Abnormal) Collected:  02/13/17 2257    Specimen:  Urine from Urine, Clean Catch Updated:  02/13/17 2334     RBC, UA 6-12 (A) /HPF      WBC, UA 3-5 (A) /HPF      Bacteria, UA None Seen /HPF      Squamous Epithelial Cells, UA 0-2 /HPF      Hyaline Casts, UA 21-30 /LPF      Methodology Manual Light Microscopy     Lactate Acid, Reflex [[id number]]  (Normal) Collected:  02/14/17 0056    Specimen:  Blood Updated:  02/14/17 0126     Lactate 0.7 mmol/L                    Imaging Results (all)     Procedure Component Value Units Date/Time    CT Abdomen Pelvis Without Contrast [[id number]] Collected:  02/13/17 2125     Updated:  02/13/17 2125    Narrative:       CT ABDOMEN AND PELVIS WITHOUT CONTRAST.     TECHNIQUE: A routine CT scan of the abdomen and pelvis was performed  with coronal and sagittal reconstructed images.     HISTORY: Left flank pain.     COMPARISON: No prior studies for comparison.     FINDINGS:  Lung bases demonstrate no consolidation or effusion. Small hiatal  hernia.      Liver demonstrates homogeneous parenchyma, no definite mass.  Unremarkable gallbladder. No intra or extrahepatic biliary ductal  dilatation.      The spleen is unremarkable. Pancreas is unremarkable, no pancreatic  ductal dilatation. Adrenal glands are within normal limits.     Kidneys do not demonstrate hydronephrosis. No definite renal calculi.  3.6 cm left renal cyst. Scarring at the upper pole left kidney and  cortical calcifications measuring up to 0.6 cm. Urinary bladder is  unremarkable.         Small and large bowel loops are within normal limits. Appendix is not  clearly visualized. Moderately large amount stool in the  colon. 1.4 cm  umbilical hernia contains fat.     No significant retroperitoneal lymphadenopathy.              Impression:       1.  No definite acute abdominal pathology, no obstructive uropathy or  inflammatory bowel disease.   2.  3.6 cm left renal cyst. Scarring in the upper pole left kidney with  cortical calcifications measuring up to 0.6 cm. No hydronephrosis.  Please note that urinary tract infection/pyelonephritis cannot be  excluded on noncontrast examination.  3.  Appendix is not visualized. Moderately large amount of stool in the  colon, patient may be constipated.  4.  If indicated further evaluation with contrast-enhanced CT scan may  be performed.              Patient Active Problem List   Diagnosis Code   • Sepsis A41.9   • Low back pain M54.5   • Drug abuse, IVDA cocaine F19.10   • Cellulitis of upper extremity L03.119       Assessment:  Principal Problem:    Sepsis  Active Problems:    Low back pain    Drug abuse, IVDA cocaine    Cellulitis of upper extremity      Plan:  The patient's admitted the hospital and cultures of been obtained.  The patient will be started on broad-spectrum IV antibiotics and will consult infectious disease.  We'll to obtain MRI of the lumbar spine and echocardiogram.  Will ask access Center to see patient about his drug abuse.    Axel De Jesus MD  2/14/2017  5:26 AM

## 2017-02-14 NOTE — PROGRESS NOTES
"Pharmacokinetic Consult - Vancomycin Dosing (Follow-up Note)    Tre Owens is on day 1 pharmacy to dose vancomycin for sepsis per consult of Dr. De Jesus.  Goal trough: 15-20mg/L     Relevant clinical data and objective history reviewed:  61 y.o. male 73\" (185.4 cm) 171 lb 4.8 oz (77.7 kg)    Past Medical History   Diagnosis Date   • Alcoholism    • Kidney failure      CREATININE   Date Value Ref Range Status   02/14/2017 0.86 0.76 - 1.27 mg/dL Final   02/13/2017 1.03 0.76 - 1.27 mg/dL Final     BUN   Date Value Ref Range Status   02/14/2017 17 8 - 23 mg/dL Final     Estimated Creatinine Clearance: 100.4 mL/min (by C-G formula based on Cr of 0.86).    Lab Results   Component Value Date    WBC 14.47 (H) 02/14/2017     Temp Readings from Last 3 Encounters:   02/14/17 98.9 °F (37.2 °C) (Oral)     Baseline culture/source/susceptibility:    2/13: blood and urine cultures in process    IV Anti-Infectives     Ordered     Dose/Rate Route Frequency Start Stop    02/14/17 0533  vancomycin 1500 mg/500 mL 0.9% NS IVPB ([facility])     Ordering Provider:  Axel De Jesus MD    1,500 mg Intravenous Every 12 Hours 02/14/17 1800      02/14/17 0533  vancomycin 2000 mg/500 mL 0.9% NS IVPB ([facility])     Ordering Provider:  Axel De Jesus MD    2,000 mg Intravenous Once 02/14/17 0615      02/14/17 0525  cefTRIAXone (ROCEPHIN) IVPB 2 g     Ordering Provider:  Axel De Jesus MD    2 g  over 30 Minutes Intravenous Every 12 Hours 02/14/17 0600      02/14/17 0525  Pharmacy to dose vancomycin     Ordering Provider:  Axel De Jesus MD     Does not apply Continuous PRN 02/14/17 0524           Assessment/Plan  Current regimen: vancomycin 2gm IV x 1 now then 1500mg (19mg/kg) IV x 12h. Trough changed to 0530 on 2/16. Pharmacy will continue to follow daily while on vancomycin and adjust as needed.     Lluvia Orta, PharmD  2/14/2017  8:20 AM      "

## 2017-02-14 NOTE — ED PROVIDER NOTES
" EMERGENCY DEPARTMENT ENCOUNTER    CHIEF COMPLAINT  Chief Complaint: flank pain  History given by: patient  History limited by: none  Room Number: 409/1  PMD: Kishor Camacho MD      HPI:  Pt is a 61 y.o. male who presents complaining of bilateral flank pain onset this morning at 4 AM and worsening today around 6 PM. Pt has a hx of \"adrenal fatigue\" and is a recovering alcoholic. His pain is worse with bending and position changes. He also has fatigue and chills. Pt states that he injected cocaine a few days ago at a site which he thinks now might be infected.    Duration:  2 days  Onset: gradual  Timing: constant  Location: bilateral flank  Radiation: none  Quality: \"pain\"  Intensity/Severity: severe  Progression: worsening since 6 PM today  Associated Symptoms: fatigue, chills  Aggravating Factors: movement, bending  Alleviating Factors: remaining still  Previous Episodes: hx of \"adrenal fatigue\", no hx of kidney stones  Treatment before arrival: none stated    PAST MEDICAL HISTORY  Active Ambulatory Problems     Diagnosis Date Noted   • No Active Ambulatory Problems     Resolved Ambulatory Problems     Diagnosis Date Noted   • No Resolved Ambulatory Problems     Past Medical History   Diagnosis Date   • Alcoholism    • Kidney failure        PAST SURGICAL HISTORY  Past Surgical History   Procedure Laterality Date   • Appendectomy         FAMILY HISTORY  Family History   Problem Relation Age of Onset   • Alcohol abuse Father        SOCIAL HISTORY  Social History     Social History   • Marital status: Single     Spouse name: N/A   • Number of children: N/A   • Years of education: N/A     Occupational History   • property management      Social History Main Topics   • Smoking status: Never Smoker   • Smokeless tobacco: Not on file   • Alcohol use No   • Drug use: Yes     Special: Cocaine   • Sexual activity: Not on file     Other Topics Concern   • Not on file     Social History Narrative       ALLERGIES  Review " of patient's allergies indicates no known allergies.    REVIEW OF SYSTEMS  Review of Systems   Constitutional: Positive for chills, fatigue and fever. Negative for activity change and appetite change.   HENT: Negative for congestion and sore throat.    Eyes: Negative.    Respiratory: Negative for cough and shortness of breath.    Cardiovascular: Negative for chest pain and leg swelling.   Gastrointestinal: Negative for abdominal pain, diarrhea and vomiting.   Endocrine: Negative.    Genitourinary: Positive for flank pain (bilateral). Negative for decreased urine volume and dysuria.   Musculoskeletal: Negative for neck pain.   Skin: Negative for rash and wound.   Allergic/Immunologic: Negative.    Neurological: Negative for weakness, numbness and headaches.   Hematological: Negative.    Psychiatric/Behavioral: Negative.    All other systems reviewed and are negative.      PHYSICAL EXAM  ED Triage Vitals   Temp Heart Rate Resp BP SpO2   02/13/17 1953 02/13/17 1953 02/13/17 1953 02/13/17 1953 02/13/17 1953   101.4 °F (38.6 °C) 86 16 130/72 98 %      Temp src Heart Rate Source Patient Position BP Location FiO2 (%)   -- -- -- -- --              Physical Exam   Constitutional: He is oriented to person, place, and time and well-developed, well-nourished, and in no distress.   HENT:   Head: Normocephalic and atraumatic.   Eyes: EOM are normal. Pupils are equal, round, and reactive to light.   Neck: Normal range of motion. Neck supple.   Cardiovascular: Regular rhythm and normal heart sounds.  Tachycardia present.    Pulmonary/Chest: Effort normal and breath sounds normal. No respiratory distress.   Abdominal: Soft. There is no tenderness. There is no rebound and no guarding.   Musculoskeletal: Normal range of motion. He exhibits no edema.        Lumbar back: He exhibits tenderness.   Neurological: He is alert and oriented to person, place, and time. He has normal sensation and normal strength.   Skin: Skin is warm and dry.    Small abscess, left forearm   Psychiatric: Mood and affect normal.   Nursing note and vitals reviewed.      LAB RESULTS  Lab Results (last 24 hours)     Procedure Component Value Units Date/Time    CBC & Differential [[id number]] Collected:  02/13/17 2043    Specimen:  Blood Updated:  02/13/17 2101    Narrative:       The following orders were created for panel order CBC & Differential.  Procedure                               Abnormality         Status                     ---------                               -----------         ------                     CBC Auto Differential[[id number]]         Abnormal            Final result                 Please view results for these tests on the individual orders.    Comprehensive Metabolic Panel [[id number]] Collected:  02/13/17 2043    Specimen:  Blood Updated:  02/13/17 2123     Glucose 82 mg/dL      BUN 18 mg/dL      Creatinine 1.03 mg/dL      Sodium 139 mmol/L      Potassium 4.1 mmol/L      Chloride 100 mmol/L      CO2 22.2 mmol/L      Calcium 9.3 mg/dL      Total Protein 7.6 g/dL      Albumin 4.00 g/dL      ALT (SGPT) 38 U/L      AST (SGOT) 28 U/L      Alkaline Phosphatase 53 U/L      Total Bilirubin 0.9 mg/dL      eGFR Non African Amer 74 mL/min/1.73      Globulin 3.6 gm/dL      A/G Ratio 1.1 g/dL      BUN/Creatinine Ratio 17.5      Anion Gap 16.8 mmol/L     Lactic Acid, Plasma [[id number]]  (Abnormal) Collected:  02/13/17 2043    Specimen:  Blood Updated:  02/13/17 2117     Lactate 2.5 (C) mmol/L     Blood Culture [[id number]] Collected:  02/13/17 2043    Specimen:  Blood from Arm, Right Updated:  02/13/17 2052    CBC Auto Differential [[id number]]  (Abnormal) Collected:  02/13/17 2043    Specimen:  Blood Updated:  02/13/17 2101     WBC 16.68 (H) 10*3/mm3      RBC 4.17 (L) 10*6/mm3      Hemoglobin 13.5 (L) g/dL      Hematocrit 40.7 %      MCV 97.6 (H) fL      MCH 32.4 pg      MCHC 33.2 g/dL      RDW 16.0 (H) %      RDW-SD 57.3 (H) fl      MPV 10.3 fL      Platelets 252  10*3/mm3      Neutrophil % 80.7 (H) %      Lymphocyte % 7.4 (L) %      Monocyte % 11.2 %      Eosinophil % 0.1 (L) %      Basophil % 0.1 %      Immature Grans % 0.5 %      Neutrophils, Absolute 13.46 (H) 10*3/mm3      Lymphocytes, Absolute 1.24 10*3/mm3      Monocytes, Absolute 1.86 (H) 10*3/mm3      Eosinophils, Absolute 0.01 10*3/mm3      Basophils, Absolute 0.02 10*3/mm3      Immature Grans, Absolute 0.09 (H) 10*3/mm3     Sedimentation Rate [[id number]]  (Abnormal) Collected:  02/13/17 2043    Specimen:  Blood Updated:  02/13/17 2308     Sed Rate 37 (H) mm/hr     C-reactive Protein [[id number]]  (Abnormal) Collected:  02/13/17 2043    Specimen:  Blood Updated:  02/13/17 2219     C-Reactive Protein 5.46 (H) mg/dL     Urinalysis With / Culture If Indicated [[id number]]  (Abnormal) Collected:  02/13/17 2257    Specimen:  Urine from Urine, Clean Catch Updated:  02/13/17 2325     Color, UA Dark Yellow (A)      Appearance, UA Cloudy (A)      pH, UA 6.0      Specific Gravity, UA >=1.030      Glucose, UA Negative      Ketones, UA 40 mg/dL (2+) (A)      Bilirubin, UA Negative      Blood, UA Negative      Protein, UA 30 mg/dL (1+) (A)      Leuk Esterase, UA Small (1+) (A)      Nitrite, UA Negative      Urobilinogen, UA 1.0 E.U./dL     Urinalysis, Microscopic Only [[id number]]  (Abnormal) Collected:  02/13/17 2257    Specimen:  Urine from Urine, Clean Catch Updated:  02/13/17 2334     RBC, UA 6-12 (A) /HPF      WBC, UA 3-5 (A) /HPF      Bacteria, UA None Seen /HPF      Squamous Epithelial Cells, UA 0-2 /HPF      Hyaline Casts, UA 21-30 /LPF      Methodology Manual Light Microscopy     Urine Culture [[id number]] Collected:  02/13/17 2257    Specimen:  Urine from Urine, Clean Catch Updated:  02/13/17 2317    Lactate Acid, Reflex [[id number]]  (Normal) Collected:  02/14/17 0056    Specimen:  Blood Updated:  02/14/17 0126     Lactate 0.7 mmol/L           I ordered the above labs and reviewed the results    RADIOLOGY  CT Abdomen Pelvis  Without Contrast   Preliminary Result   1.  No definite acute abdominal pathology, no obstructive uropathy or   inflammatory bowel disease.    2.  3.6 cm left renal cyst. Scarring in the upper pole left kidney with   cortical calcifications measuring up to 0.6 cm. No hydronephrosis.   Please note that urinary tract infection/pyelonephritis cannot be   excluded on noncontrast examination.   3.  Appendix is not visualized. Moderately large amount of stool in the   colon, patient may be constipated.   4.  If indicated further evaluation with contrast-enhanced CT scan may   be performed.              MRI Lumbar Spine Without Contrast    (Results Pending)      I ordered the above noted radiological studies. Interpreted by radiologist. Reviewed by me in PACS.       PROCEDURES  Procedures      PROGRESS AND CONSULTS  ED Course   Value Comment By Time   WBC: (!) 16.68 (Reviewed) Kalen Jarvis MD 02/13 2139 2025 - Labs, CT abd/pelvis ordered for further evaluation. Dilaudid and Zofran ordered for pain and nausea, IVF ordered for hydration, Tylenol ordered for fever.    2241 - Lactate and WBC elevated, will admit for inpatient abx. Sepsis fluid bolus has been ordered.    2324 - Call w/ Dr. De Jesus who agrees to admit the pt.      MEDICAL DECISION MAKING  Results were reviewed/discussed with the patient and they were also made aware of online access. Pt also made aware that some labs, such as cultures, will not be resulted during ER visit and follow up with PMD is necessary.     MDM  Number of Diagnoses or Management Options     Amount and/or Complexity of Data Reviewed  Clinical lab tests: ordered and reviewed (Lactate 2.5, WBC 16.68)  Tests in the radiology section of CPT®: ordered and reviewed (CT abd/pelvis - no acute abdominal pathology, 3.6 cm left renal cyst, moderately large amount of stool)  Discuss the patient with other providers: yes  Independent visualization of images, tracings, or specimens: yes          DIAGNOSIS  Final diagnoses:   Sepsis, due to unspecified organism   Cellulitis of left upper extremity   Cellulitis of right upper extremity   Acute midline low back pain without sciatica       DISPOSITION  ADMISSION: Patient admitted by Dr. De Jesus to telemetry.    Discussed treatment plan and reason for admission with pt/family and admitting physician.  Pt/family voiced understanding of the plan for admission for further testing/treatment as needed.     Latest Documented Vital Signs:  As of 5:39 AM  BP- 102/65 HR- 82 Temp- 98.4 °F (36.9 °C) (Oral) O2 sat- 98%    --  Documentation assistance provided by hailey Mcdonald III, PA for Adryan Mcdonald PA-C.  Information recorded by the clareibhemant was done at my direction and has been verified and validated by me.       Braeden Goldberg  02/13/17 1179       Braeden Goldberg  02/13/17 9254       MIKEY Bradley III  02/14/17 0559

## 2017-02-14 NOTE — ED PROVIDER NOTES
Pt presents to the ED c/o back pain. Pt also c/o abscesses to inside of elbows secondary to cocaine use. On exam pt is A&Ox3, flushed, mild distress, PERRL, dry mucus membranes, L AC has abscess 3x2cm, R AC has no erythema, erythema over R wrist, normal neuro exam. Will treat for sepsis. Have some concern for epidural abscess and will discuss w/ admitting physician.    2148  Ordered Zosyn, vancomycin, and IVF for infection.    I agree with the midlevel provider's findings and plan.  I reviewed the midlevel providers note.    I supervised care provided by the midlevel provider.    We have discussed this patient's history, physical exam, and treatment plan.   I have reviewed the note and personally saw and examined the patient and agree with the plan of care.    Documentation assistance provided by hailey Cheng.  Information recorded by the scribe was done at my direction and has been verified and validated by me.       Michael Cheng  02/13/17 9190       Kalen Jarvis MD  02/22/17 4301

## 2017-02-15 LAB
ANION GAP SERPL CALCULATED.3IONS-SCNC: 14.3 MMOL/L
ASCENDING AORTA: 3.1 CM
BACTERIA SPEC AEROBE CULT: ABNORMAL
BASOPHILS # BLD AUTO: 0.02 10*3/MM3 (ref 0–0.2)
BASOPHILS NFR BLD AUTO: 0.1 % (ref 0–1.5)
BH CV ECHO MEAS - ACS: 1.8 CM
BH CV ECHO MEAS - AO MEAN PG (FULL): 2 MMHG
BH CV ECHO MEAS - AO MEAN PG: 7 MMHG
BH CV ECHO MEAS - AO ROOT AREA (BSA CORRECTED): 1.7
BH CV ECHO MEAS - AO ROOT AREA: 9.6 CM^2
BH CV ECHO MEAS - AO ROOT DIAM: 3.5 CM
BH CV ECHO MEAS - AO V2 MAX: 170 CM/SEC
BH CV ECHO MEAS - AO V2 MEAN: 117 CM/SEC
BH CV ECHO MEAS - AO V2 VTI: 27.8 CM
BH CV ECHO MEAS - ASC AORTA: 3.1 CM
BH CV ECHO MEAS - AVA(I,A): 3.7 CM^2
BH CV ECHO MEAS - AVA(I,D): 3.7 CM^2
BH CV ECHO MEAS - BSA(HAYCOCK): 2 M^2
BH CV ECHO MEAS - BSA: 2 M^2
BH CV ECHO MEAS - BZI_BMI: 22.6 KILOGRAMS/M^2
BH CV ECHO MEAS - BZI_METRIC_HEIGHT: 185.4 CM
BH CV ECHO MEAS - BZI_METRIC_WEIGHT: 77.6 KG
BH CV ECHO MEAS - CONTRAST EF (2CH): 67.6 ML/M^2
BH CV ECHO MEAS - CONTRAST EF 4CH: 64.1 ML/M^2
BH CV ECHO MEAS - EDV(CUBED): 103.8 ML
BH CV ECHO MEAS - EDV(MOD-SP2): 105 ML
BH CV ECHO MEAS - EDV(MOD-SP4): 92 ML
BH CV ECHO MEAS - EDV(TEICH): 102.4 ML
BH CV ECHO MEAS - EF(CUBED): 76.5 %
BH CV ECHO MEAS - EF(MOD-SP2): 67.6 %
BH CV ECHO MEAS - EF(MOD-SP4): 64.1 %
BH CV ECHO MEAS - EF(TEICH): 68.5 %
BH CV ECHO MEAS - ESV(CUBED): 24.4 ML
BH CV ECHO MEAS - ESV(MOD-SP2): 34 ML
BH CV ECHO MEAS - ESV(MOD-SP4): 33 ML
BH CV ECHO MEAS - ESV(TEICH): 32.2 ML
BH CV ECHO MEAS - FS: 38.3 %
BH CV ECHO MEAS - IVS/LVPW: 0.86
BH CV ECHO MEAS - IVSD: 1.2 CM
BH CV ECHO MEAS - LAT PEAK E' VEL: 11 CM/SEC
BH CV ECHO MEAS - LV DIASTOLIC VOL/BSA (35-75): 45.7 ML/M^2
BH CV ECHO MEAS - LV MASS(C)D: 237.9 GRAMS
BH CV ECHO MEAS - LV MASS(C)DI: 118.2 GRAMS/M^2
BH CV ECHO MEAS - LV MEAN PG: 5 MMHG
BH CV ECHO MEAS - LV SYSTOLIC VOL/BSA (12-30): 16.4 ML/M^2
BH CV ECHO MEAS - LV V1 MAX: 157 CM/SEC
BH CV ECHO MEAS - LV V1 MEAN: 105 CM/SEC
BH CV ECHO MEAS - LV V1 VTI: 24.9 CM
BH CV ECHO MEAS - LVIDD: 4.7 CM
BH CV ECHO MEAS - LVIDS: 2.9 CM
BH CV ECHO MEAS - LVLD AP2: 8.3 CM
BH CV ECHO MEAS - LVLD AP4: 9.3 CM
BH CV ECHO MEAS - LVLS AP2: 6.9 CM
BH CV ECHO MEAS - LVLS AP4: 6.8 CM
BH CV ECHO MEAS - LVOT AREA (M): 4.2 CM^2
BH CV ECHO MEAS - LVOT AREA: 4.2 CM^2
BH CV ECHO MEAS - LVOT DIAM: 2.3 CM
BH CV ECHO MEAS - LVPWD: 1.4 CM
BH CV ECHO MEAS - MED PEAK E' VEL: 8 CM/SEC
BH CV ECHO MEAS - MV A DUR: 0.1 SEC
BH CV ECHO MEAS - MV A MAX VEL: 80.2 CM/SEC
BH CV ECHO MEAS - MV DEC SLOPE: 429 CM/SEC^2
BH CV ECHO MEAS - MV DEC TIME: 0.27 SEC
BH CV ECHO MEAS - MV E MAX VEL: 84 CM/SEC
BH CV ECHO MEAS - MV E/A: 1
BH CV ECHO MEAS - MV MEAN PG: 2 MMHG
BH CV ECHO MEAS - MV P1/2T MAX VEL: 91.7 CM/SEC
BH CV ECHO MEAS - MV P1/2T: 62.6 MSEC
BH CV ECHO MEAS - MV V2 MEAN: 62.5 CM/SEC
BH CV ECHO MEAS - MV V2 VTI: 24.9 CM
BH CV ECHO MEAS - MVA P1/2T LCG: 2.4 CM^2
BH CV ECHO MEAS - MVA(P1/2T): 3.5 CM^2
BH CV ECHO MEAS - MVA(VTI): 4.2 CM^2
BH CV ECHO MEAS - PA ACC SLOPE: 0 CM/SEC^2
BH CV ECHO MEAS - PA ACC TIME: 0.13 SEC
BH CV ECHO MEAS - PA MAX PG: 10.1 MMHG
BH CV ECHO MEAS - PA PR(ACCEL): 21.9 MMHG
BH CV ECHO MEAS - PA V2 MAX: 159 CM/SEC
BH CV ECHO MEAS - PULM A REVS DUR: 0.11 SEC
BH CV ECHO MEAS - PULM A REVS VEL: 82.3 CM/SEC
BH CV ECHO MEAS - PULM DIAS VEL: 61.9 CM/SEC
BH CV ECHO MEAS - PULM S/D: 1.5
BH CV ECHO MEAS - PULM SYS VEL: 90.1 CM/SEC
BH CV ECHO MEAS - QP/QS: 1.7
BH CV ECHO MEAS - RAP SYSTOLE: 3 MMHG
BH CV ECHO MEAS - RV MEAN PG: 2 MMHG
BH CV ECHO MEAS - RV V1 MEAN: 70.1 CM/SEC
BH CV ECHO MEAS - RV V1 VTI: 19.9 CM
BH CV ECHO MEAS - RVOT AREA: 9.1 CM^2
BH CV ECHO MEAS - RVOT DIAM: 3.4 CM
BH CV ECHO MEAS - RVSP: 40.9 MMHG
BH CV ECHO MEAS - SI(AO): 132.9 ML/M^2
BH CV ECHO MEAS - SI(CUBED): 39.5 ML/M^2
BH CV ECHO MEAS - SI(LVOT): 51.4 ML/M^2
BH CV ECHO MEAS - SI(MOD-SP2): 35.3 ML/M^2
BH CV ECHO MEAS - SI(MOD-SP4): 29.3 ML/M^2
BH CV ECHO MEAS - SI(TEICH): 34.8 ML/M^2
BH CV ECHO MEAS - SV(AO): 267.5 ML
BH CV ECHO MEAS - SV(CUBED): 79.4 ML
BH CV ECHO MEAS - SV(LVOT): 103.5 ML
BH CV ECHO MEAS - SV(MOD-SP2): 71 ML
BH CV ECHO MEAS - SV(MOD-SP4): 59 ML
BH CV ECHO MEAS - SV(RVOT): 180.7 ML
BH CV ECHO MEAS - SV(TEICH): 70.1 ML
BH CV ECHO MEAS - TAPSE (>1.6): 2.5 CM2
BH CV ECHO MEAS - TR MAX VEL: 308 CM/SEC
BH CV XLRA - RV BASE: 3.3 CM
BH CV XLRA - TDI S': 17 CM/SEC
BUN BLD-MCNC: 10 MG/DL (ref 8–23)
BUN/CREAT SERPL: 11.9 (ref 7–25)
CALCIUM SPEC-SCNC: 8.1 MG/DL (ref 8.6–10.5)
CHLORIDE SERPL-SCNC: 99 MMOL/L (ref 98–107)
CO2 SERPL-SCNC: 21.7 MMOL/L (ref 22–29)
CREAT BLD-MCNC: 0.84 MG/DL (ref 0.76–1.27)
DEPRECATED RDW RBC AUTO: 55.3 FL (ref 37–54)
E/E' RATIO: 9
EOSINOPHIL # BLD AUTO: 0.02 10*3/MM3 (ref 0–0.7)
EOSINOPHIL NFR BLD AUTO: 0.1 % (ref 0.3–6.2)
ERYTHROCYTE [DISTWIDTH] IN BLOOD BY AUTOMATED COUNT: 15.6 % (ref 11.5–14.5)
GFR SERPL CREATININE-BSD FRML MDRD: 93 ML/MIN/1.73
GLUCOSE BLD-MCNC: 87 MG/DL (ref 65–99)
HAV IGM SERPL QL IA: NORMAL
HBV CORE IGM SERPL QL IA: NORMAL
HBV SURFACE AG SERPL QL IA: NORMAL
HCT VFR BLD AUTO: 32.2 % (ref 40.4–52.2)
HCV AB SER DONR QL: NORMAL
HGB BLD-MCNC: 10.8 G/DL (ref 13.7–17.6)
HIV1 P24 AG SER QL: NORMAL
HIV1+2 AB SER QL: NORMAL
IMM GRANULOCYTES # BLD: 0.06 10*3/MM3 (ref 0–0.03)
IMM GRANULOCYTES NFR BLD: 0.4 % (ref 0–0.5)
LEFT ATRIUM VOLUME INDEX: 33 ML/M2
LV EF 2D ECHO EST: 64 %
LYMPHOCYTES # BLD AUTO: 1.17 10*3/MM3 (ref 0.9–4.8)
LYMPHOCYTES NFR BLD AUTO: 7.6 % (ref 19.6–45.3)
MCH RBC QN AUTO: 32.5 PG (ref 27–32.7)
MCHC RBC AUTO-ENTMCNC: 33.5 G/DL (ref 32.6–36.4)
MCV RBC AUTO: 97 FL (ref 79.8–96.2)
MONOCYTES # BLD AUTO: 1.45 10*3/MM3 (ref 0.2–1.2)
MONOCYTES NFR BLD AUTO: 9.4 % (ref 5–12)
MRSA SPEC QL CULT: ABNORMAL
NEUTROPHILS # BLD AUTO: 12.73 10*3/MM3 (ref 1.9–8.1)
NEUTROPHILS NFR BLD AUTO: 82.4 % (ref 42.7–76)
PLATELET # BLD AUTO: 225 10*3/MM3 (ref 140–500)
PMV BLD AUTO: 10 FL (ref 6–12)
POTASSIUM BLD-SCNC: 3.4 MMOL/L (ref 3.5–5.2)
RBC # BLD AUTO: 3.32 10*6/MM3 (ref 4.6–6)
SODIUM BLD-SCNC: 135 MMOL/L (ref 136–145)
WBC NRBC COR # BLD: 15.45 10*3/MM3 (ref 4.5–10.7)

## 2017-02-15 PROCEDURE — 99233 SBSQ HOSP IP/OBS HIGH 50: CPT | Performed by: INTERNAL MEDICINE

## 2017-02-15 PROCEDURE — 87040 BLOOD CULTURE FOR BACTERIA: CPT | Performed by: INTERNAL MEDICINE

## 2017-02-15 PROCEDURE — 25010000002 ONDANSETRON PER 1 MG: Performed by: HOSPITALIST

## 2017-02-15 PROCEDURE — 87899 AGENT NOS ASSAY W/OPTIC: CPT | Performed by: INTERNAL MEDICINE

## 2017-02-15 PROCEDURE — 80074 ACUTE HEPATITIS PANEL: CPT | Performed by: INTERNAL MEDICINE

## 2017-02-15 PROCEDURE — 25010000002 VANCOMYCIN: Performed by: HOSPITALIST

## 2017-02-15 PROCEDURE — 80048 BASIC METABOLIC PNL TOTAL CA: CPT | Performed by: HOSPITALIST

## 2017-02-15 PROCEDURE — G0432 EIA HIV-1/HIV-2 SCREEN: HCPCS | Performed by: INTERNAL MEDICINE

## 2017-02-15 PROCEDURE — 25010000002 HYDROMORPHONE PER 4 MG: Performed by: HOSPITALIST

## 2017-02-15 PROCEDURE — 85025 COMPLETE CBC W/AUTO DIFF WBC: CPT | Performed by: HOSPITALIST

## 2017-02-15 PROCEDURE — 25010000003 CEFTRIAXONE PER 250 MG: Performed by: INTERNAL MEDICINE

## 2017-02-15 RX ORDER — HYDROCODONE BITARTRATE AND ACETAMINOPHEN 7.5; 325 MG/1; MG/1
1 TABLET ORAL EVERY 4 HOURS PRN
Status: DISCONTINUED | OUTPATIENT
Start: 2017-02-15 | End: 2017-02-19 | Stop reason: HOSPADM

## 2017-02-15 RX ORDER — HYDROMORPHONE HYDROCHLORIDE 1 MG/ML
0.5 INJECTION, SOLUTION INTRAMUSCULAR; INTRAVENOUS; SUBCUTANEOUS EVERY 6 HOURS PRN
Status: DISCONTINUED | OUTPATIENT
Start: 2017-02-15 | End: 2017-02-16

## 2017-02-15 RX ORDER — POTASSIUM CHLORIDE 750 MG/1
20 CAPSULE, EXTENDED RELEASE ORAL ONCE
Status: COMPLETED | OUTPATIENT
Start: 2017-02-15 | End: 2017-02-15

## 2017-02-15 RX ADMIN — NAFCILLIN SODIUM 2 G: 2 INJECTION, POWDER, LYOPHILIZED, FOR SOLUTION INTRAMUSCULAR; INTRAVENOUS at 17:06

## 2017-02-15 RX ADMIN — Medication 5 MG: at 23:41

## 2017-02-15 RX ADMIN — HYDROCODONE BITARTRATE AND ACETAMINOPHEN 1 TABLET: 7.5; 325 TABLET ORAL at 17:06

## 2017-02-15 RX ADMIN — NAFCILLIN SODIUM 2 G: 2 INJECTION, POWDER, LYOPHILIZED, FOR SOLUTION INTRAMUSCULAR; INTRAVENOUS at 20:06

## 2017-02-15 RX ADMIN — HYDROCODONE BITARTRATE AND ACETAMINOPHEN 1 TABLET: 7.5; 325 TABLET ORAL at 21:14

## 2017-02-15 RX ADMIN — HYDROMORPHONE HYDROCHLORIDE 0.5 MG: 1 INJECTION, SOLUTION INTRAMUSCULAR; INTRAVENOUS; SUBCUTANEOUS at 03:00

## 2017-02-15 RX ADMIN — VANCOMYCIN HYDROCHLORIDE 1500 MG: 1 INJECTION, POWDER, LYOPHILIZED, FOR SOLUTION INTRAVENOUS at 06:16

## 2017-02-15 RX ADMIN — DOCUSATE SODIUM 100 MG: 100 CAPSULE, LIQUID FILLED ORAL at 17:06

## 2017-02-15 RX ADMIN — NAFCILLIN SODIUM 2 G: 2 INJECTION, POWDER, LYOPHILIZED, FOR SOLUTION INTRAMUSCULAR; INTRAVENOUS at 23:41

## 2017-02-15 RX ADMIN — ONDANSETRON 4 MG: 2 INJECTION INTRAMUSCULAR; INTRAVENOUS at 03:00

## 2017-02-15 RX ADMIN — NAFCILLIN SODIUM 2 G: 2 INJECTION, POWDER, LYOPHILIZED, FOR SOLUTION INTRAMUSCULAR; INTRAVENOUS at 13:00

## 2017-02-15 RX ADMIN — CEFTRIAXONE SODIUM 2 G: 2 INJECTION, SOLUTION INTRAVENOUS at 08:10

## 2017-02-15 RX ADMIN — HYDROMORPHONE HYDROCHLORIDE 0.5 MG: 1 INJECTION, SOLUTION INTRAMUSCULAR; INTRAVENOUS; SUBCUTANEOUS at 06:16

## 2017-02-15 RX ADMIN — POTASSIUM CHLORIDE 20 MEQ: 750 CAPSULE, EXTENDED RELEASE ORAL at 17:06

## 2017-02-15 RX ADMIN — HYDROMORPHONE HYDROCHLORIDE 0.5 MG: 1 INJECTION, SOLUTION INTRAMUSCULAR; INTRAVENOUS; SUBCUTANEOUS at 10:39

## 2017-02-15 RX ADMIN — HYDROMORPHONE HYDROCHLORIDE 0.5 MG: 1 INJECTION, SOLUTION INTRAMUSCULAR; INTRAVENOUS; SUBCUTANEOUS at 13:57

## 2017-02-15 NOTE — PLAN OF CARE
Problem: Patient Care Overview (Adult)  Goal: Plan of Care Review  Outcome: Ongoing (interventions implemented as appropriate)    02/15/17 0330   Coping/Psychosocial Response Interventions   Plan Of Care Reviewed With patient   Patient Care Overview   Progress no change         Problem: Pain, Acute (Adult)  Goal: Identify Related Risk Factors and Signs and Symptoms  Outcome: Ongoing (interventions implemented as appropriate)  Goal: Acceptable Pain Control/Comfort Level  Outcome: Ongoing (interventions implemented as appropriate)    02/15/17 0330   Pain, Acute (Adult)   Acceptable Pain Control/Comfort Level making progress toward outcome         Problem: Cellulitis (Adult)  Goal: Signs and Symptoms of Listed Potential Problems Will be Absent or Manageable (Cellulitis)  Outcome: Ongoing (interventions implemented as appropriate)    02/15/17 0330   Cellulitis   Problems Assessed (Cellulitis) all   Problems Present (Cellulitis) pain;infection;situational response

## 2017-02-15 NOTE — PROGRESS NOTES
LOS: 2 days     Chief Complaint:  Follow-up MSSA bacteremia    Interval History:  No acute events. MRI reassuring. He still has sharp lower back pain limiting his range of motion so it is worse w/ bending over. Tolerating abx w/o rash or diarrhea. His R thumb and L elbow pain, swelling, and erythema are all improved.    ROS: no n/v/d; no CP or SOA    Vital Signs  Temp:  [97.4 °F (36.3 °C)-101.3 °F (38.5 °C)] 98.9 °F (37.2 °C)  Heart Rate:  [] 88  Resp:  [18-20] 18  BP: (107-135)/(58-75) 115/66    Physical Exam:  General: awake, alert  Head: Normocephalic  Eyes: PERRL, EOMI, no scleral icterus  ENT: MMM, OP clear, no thrush. Good dentition.   Neck: Supple  Cardiovascular: NR, RR, no murmurs, rubs, or gallops;no LE edema  Respiratory: normal work of breathing on ambient air  GI: Abdomen is soft, non-tender, non-distended  : no Cai catheter present  Musculoskeletal: L AC fossa abscess, R thumb erythema improved, swelling  Skin: No rashes, lesions, or embolic phenomenon  Neurological: Alert and oriented x 3, motor strength 5/5 in all four extremities  Psychiatric: Normal mood and affect   Vasc: no cyanosis; PIV w/o erythema    Antibiotics:  Nafcillin 2 g IV q4h    LABS:  CBC, CMP, CRP, micro reviewed today  Lab Results   Component Value Date    WBC 15.45 (H) 02/15/2017    HGB 10.8 (L) 02/15/2017    HCT 32.2 (L) 02/15/2017    MCV 97.0 (H) 02/15/2017     02/15/2017     Lab Results   Component Value Date    GLUCOSE 87 02/15/2017    BUN 10 02/15/2017    CREATININE 0.84 02/15/2017    EGFRIFNONA 93 02/15/2017    EGFRIFAFRI  09/20/2016      Comment:      <15 Indicative of kidney failure.    BCR 11.9 02/15/2017    CO2 21.7 (L) 02/15/2017    CALCIUM 8.1 (L) 02/15/2017    ALBUMIN 4.00 02/13/2017    LABIL2 1.1 02/13/2017    AST 28 02/13/2017    ALT 38 02/13/2017    CRP 5.46 (H) 02/13/2017     Microbiology:  2/13 BCx: MSSA  2/13 UCx: MSSA    Radiology (personally reviewed):   MRI negative for epidural abscess or  osteomyelitis  Arm US negative for abscess    Assessment/Plan   1. MSSA septicemia secondary to IV cocaine abuse - new problem  -stop vanco and ceftriaxone  -start nafcillin 2 g IV q4h  -check LFTs w/ AM labs  -TTE was negative  -repeat blood cultures today to document clearance  -he will likely need 4 weeks of treatment    2. IVDU  -HIV and Hep C negative  -can't go home w/ PICC; we will need to consider other options (ACU vs PO antibiotics)    3. Lower back pain  -MRI negative for epidural abscess or osteomyelitis    4. L AC fossa abscess and R thumb cellulitis  -Arm US negative for abscess  -hand surgery following    5. . EtOH abuse - monitor for signs of withdrawal     Thank you for this consult. ID will follow.

## 2017-02-15 NOTE — CONSULTS
"Chart was reviewed.  Attempted to complete ISMAEL evaluation.  Patient lying quietly on bed.  He states when name called, \"I'm sleeping.\"  Explained reason for visit.  He states he doesn't need an evaluation, \"I'm and addict, I relapsed and I know what I need to do.\"  He is active w/ AA and has a sponsor.  He denies any SI/HI or wish to die.      Given patient's refusing the EVAL no further need for [facility] to follow.    "

## 2017-02-15 NOTE — PLAN OF CARE
Problem: Patient Care Overview (Adult)  Goal: Plan of Care Review  Outcome: Ongoing (interventions implemented as appropriate)    02/15/17 0823   Outcome Evaluation   Outcome Summary/Follow up Plan wean off IV pain medications and try PO per MD; continue ABX as prescibed       Goal: Adult Individualization and Mutuality  Outcome: Ongoing (interventions implemented as appropriate)  Goal: Discharge Needs Assessment  Outcome: Ongoing (interventions implemented as appropriate)    Problem: Pain, Acute (Adult)  Goal: Identify Related Risk Factors and Signs and Symptoms  Outcome: Ongoing (interventions implemented as appropriate)    Problem: Cellulitis (Adult)  Goal: Signs and Symptoms of Listed Potential Problems Will be Absent or Manageable (Cellulitis)  Outcome: Ongoing (interventions implemented as appropriate)

## 2017-02-15 NOTE — PROGRESS NOTES
" LOS: 2 days   Primary Care Physician: Kishor Camacho MD     Subjective  Sleepy.  Right thumb area feels better, left antecubital fossa about the same    Vital Signs  Body mass index is 22.6 kg/(m^2).  Temp:  [97.4 °F (36.3 °C)-101.2 °F (38.4 °C)] 99.9 °F (37.7 °C)  Heart Rate:  [] 89  Resp:  [16-18] 16  BP: (113-135)/(58-69) 113/69      Objective:  General Appearance:  In no acute distress.    Vital signs: (most recent): Blood pressure 113/69, pulse 89, temperature 99.9 °F (37.7 °C), temperature source Oral, resp. rate 16, height 73\" (185.4 cm), weight 171 lb 4.8 oz (77.7 kg), SpO2 96 %.    Lungs:  There are decreased breath sounds.  No wheezes, rales or rhonchi.    Heart: Normal rate.  Regular rhythm.  No murmur.   Abdomen: Abdomen is soft and non-distended.  There is no abdominal tenderness.   There is no mass.   Extremities: There is no dependent edema.  (Induration with erythema and mild tenderness just inferior to left antecubital fossa.  Induration and tenderness at the base of right thumb MCP)  Neurological: Patient is alert.          Results Review:    I reviewed the patient's new clinical results.      Results from last 7 days  Lab Units 02/15/17  0456 02/14/17  0552   WBC 10*3/mm3 15.45* 14.47*   HEMOGLOBIN g/dL 10.8* 11.9*   PLATELETS 10*3/mm3 225 257       Results from last 7 days  Lab Units 02/15/17  0456 02/14/17  0552   SODIUM mmol/L 135* 140   POTASSIUM mmol/L 3.4* 4.1   CHLORIDE mmol/L 99 104   TOTAL CO2 mmol/L 21.7* 21.8*   BUN mg/dL 10 17   CREATININE mg/dL 0.84 0.86   CALCIUM mg/dL 8.1* 8.4*   GLUCOSE mg/dL 87 87         Hemoglobin A1C:No results found for: HGBA1C    Glucose Range:No results found for: POCGLU    Medication Review: Yes    Assessment/Plan     Active Hospital Problems (** Indicates Principal Problem)    Diagnosis Date Noted   • **Sepsis [A41.9] 02/13/2017   • Low back pain [M54.5] 02/14/2017   • Drug abuse, IVDA cocaine [F19.10] 02/14/2017   • Cellulitis of upper " extremity [L03.119] 02/14/2017      Resolved Hospital Problems    Diagnosis Date Noted Date Resolved   No resolved problems to display.       Assessment & Plan  1.  MSSA bacteremia with cellulitis right thumb base and the left antecubital fossa.  Ultrasound of left antecubital fossa without abscess.  Await further recommendations from hand surgery.  Continue IV nafcillin.  He'll need a 4 week course per Dr. Faustin, by mouth versus IV.  Repeat blood cultures done today  2.  IV drug usage.  HIV and hep C negative.  Decrease Dilaudid  3.  Mild hypokalemia.  We'll give 1 dose of 20 mEq potassium  4.  Alcoholism, no sign of withdrawal  5.  Back pain.  MRI negative for abscess      Lyndsay Alfonso MD  02/15/17  3:45 PM

## 2017-02-16 ENCOUNTER — TRANSCRIBE ORDERS (OUTPATIENT)
Dept: ADMINISTRATIVE | Facility: HOSPITAL | Age: 62
End: 2017-02-16

## 2017-02-16 DIAGNOSIS — A41.01 MSSA (METHICILLIN SUSCEPTIBLE STAPHYLOCOCCUS AUREUS) SEPTICEMIA (HCC): Primary | ICD-10-CM

## 2017-02-16 LAB
ALBUMIN SERPL-MCNC: 3 G/DL (ref 3.5–5.2)
ALBUMIN/GLOB SERPL: 0.9 G/DL
ALP SERPL-CCNC: 42 U/L (ref 39–117)
ALT SERPL W P-5'-P-CCNC: 30 U/L (ref 1–41)
ANION GAP SERPL CALCULATED.3IONS-SCNC: 15.7 MMOL/L
AST SERPL-CCNC: 22 U/L (ref 1–40)
BACTERIA SPEC AEROBE CULT: ABNORMAL
BACTERIA SPEC AEROBE CULT: ABNORMAL
BILIRUB SERPL-MCNC: 0.4 MG/DL (ref 0.1–1.2)
BUN BLD-MCNC: 9 MG/DL (ref 8–23)
BUN/CREAT SERPL: 9.1 (ref 7–25)
CALCIUM SPEC-SCNC: 8.5 MG/DL (ref 8.6–10.5)
CHLORIDE SERPL-SCNC: 99 MMOL/L (ref 98–107)
CO2 SERPL-SCNC: 24.3 MMOL/L (ref 22–29)
CREAT BLD-MCNC: 0.99 MG/DL (ref 0.76–1.27)
DEPRECATED RDW RBC AUTO: 53.9 FL (ref 37–54)
ERYTHROCYTE [DISTWIDTH] IN BLOOD BY AUTOMATED COUNT: 15.6 % (ref 11.5–14.5)
GFR SERPL CREATININE-BSD FRML MDRD: 77 ML/MIN/1.73
GLOBULIN UR ELPH-MCNC: 3.4 GM/DL
GLUCOSE BLD-MCNC: 80 MG/DL (ref 65–99)
GRAM STN SPEC: ABNORMAL
HCT VFR BLD AUTO: 32 % (ref 40.4–52.2)
HGB BLD-MCNC: 10.6 G/DL (ref 13.7–17.6)
ISOLATED FROM: ABNORMAL
MCH RBC QN AUTO: 31.5 PG (ref 27–32.7)
MCHC RBC AUTO-ENTMCNC: 33.1 G/DL (ref 32.6–36.4)
MCV RBC AUTO: 95 FL (ref 79.8–96.2)
PLATELET # BLD AUTO: 247 10*3/MM3 (ref 140–500)
PMV BLD AUTO: 10.3 FL (ref 6–12)
POTASSIUM BLD-SCNC: 3.5 MMOL/L (ref 3.5–5.2)
PROT SERPL-MCNC: 6.4 G/DL (ref 6–8.5)
RBC # BLD AUTO: 3.37 10*6/MM3 (ref 4.6–6)
SODIUM BLD-SCNC: 139 MMOL/L (ref 136–145)
WBC NRBC COR # BLD: 12.77 10*3/MM3 (ref 4.5–10.7)

## 2017-02-16 PROCEDURE — 85027 COMPLETE CBC AUTOMATED: CPT | Performed by: INTERNAL MEDICINE

## 2017-02-16 PROCEDURE — 99232 SBSQ HOSP IP/OBS MODERATE 35: CPT | Performed by: INTERNAL MEDICINE

## 2017-02-16 PROCEDURE — 80053 COMPREHEN METABOLIC PANEL: CPT | Performed by: INTERNAL MEDICINE

## 2017-02-16 RX ORDER — LORAZEPAM 0.5 MG/1
0.5 TABLET ORAL EVERY 6 HOURS PRN
Status: DISCONTINUED | OUTPATIENT
Start: 2017-02-16 | End: 2017-02-19 | Stop reason: HOSPADM

## 2017-02-16 RX ADMIN — HYDROCODONE BITARTRATE AND ACETAMINOPHEN 1 TABLET: 7.5; 325 TABLET ORAL at 13:06

## 2017-02-16 RX ADMIN — LORAZEPAM 0.5 MG: 0.5 TABLET ORAL at 20:25

## 2017-02-16 RX ADMIN — HYDROCODONE BITARTRATE AND ACETAMINOPHEN 1 TABLET: 7.5; 325 TABLET ORAL at 17:00

## 2017-02-16 RX ADMIN — HYDROCODONE BITARTRATE AND ACETAMINOPHEN 1 TABLET: 7.5; 325 TABLET ORAL at 01:53

## 2017-02-16 RX ADMIN — NAFCILLIN SODIUM 2 G: 2 INJECTION, POWDER, LYOPHILIZED, FOR SOLUTION INTRAMUSCULAR; INTRAVENOUS at 08:38

## 2017-02-16 RX ADMIN — Medication 5 MG: at 20:25

## 2017-02-16 RX ADMIN — HYDROCODONE BITARTRATE AND ACETAMINOPHEN 1 TABLET: 7.5; 325 TABLET ORAL at 05:49

## 2017-02-16 RX ADMIN — NAFCILLIN SODIUM 2 G: 2 INJECTION, POWDER, LYOPHILIZED, FOR SOLUTION INTRAMUSCULAR; INTRAVENOUS at 12:30

## 2017-02-16 RX ADMIN — NAFCILLIN SODIUM 2 G: 2 INJECTION, POWDER, LYOPHILIZED, FOR SOLUTION INTRAMUSCULAR; INTRAVENOUS at 04:53

## 2017-02-16 RX ADMIN — NAFCILLIN SODIUM 2 G: 2 INJECTION, POWDER, LYOPHILIZED, FOR SOLUTION INTRAMUSCULAR; INTRAVENOUS at 20:25

## 2017-02-16 RX ADMIN — HYDROCODONE BITARTRATE AND ACETAMINOPHEN 1 TABLET: 7.5; 325 TABLET ORAL at 21:19

## 2017-02-16 RX ADMIN — NAFCILLIN SODIUM 2 G: 2 INJECTION, POWDER, LYOPHILIZED, FOR SOLUTION INTRAMUSCULAR; INTRAVENOUS at 17:00

## 2017-02-16 NOTE — PLAN OF CARE
Problem: Patient Care Overview (Adult)  Goal: Plan of Care Review  Outcome: Ongoing (interventions implemented as appropriate)    02/16/17 0049 02/16/17 1418 02/16/17 1501   Coping/Psychosocial Response Interventions   Plan Of Care Reviewed With --  patient --    Patient Care Overview   Progress no change --  --    Outcome Evaluation   Outcome Summary/Follow up Plan --  --  po pain meds, walked around nursing station once, tolerated well       Goal: Adult Individualization and Mutuality  Outcome: Ongoing (interventions implemented as appropriate)  Goal: Discharge Needs Assessment  Outcome: Ongoing (interventions implemented as appropriate)    Problem: Pain, Acute (Adult)  Goal: Identify Related Risk Factors and Signs and Symptoms  Outcome: Ongoing (interventions implemented as appropriate)  Goal: Acceptable Pain Control/Comfort Level  Outcome: Ongoing (interventions implemented as appropriate)    Problem: Cellulitis (Adult)  Goal: Signs and Symptoms of Listed Potential Problems Will be Absent or Manageable (Cellulitis)  Outcome: Ongoing (interventions implemented as appropriate)

## 2017-02-16 NOTE — PROGRESS NOTES
Continued Stay Note  [facility]     Patient Name: Tre Owens  MRN: [id number]  Today's Date: 2/16/2017    Admit Date: 2/13/2017          Discharge Plan       02/16/17 1357    Case Management/Social Work Plan    Plan Home with family that lives near him and [facility] - to start on Saturday, 2/18/17 at 9am for 4 weeks.     Additional Comments Dr. Bullington called CCP and asks that CCP set patient up to start IV antibiotic daily at [facility] starting Saturday, 2/18/17.  Patient is scheduled for 9 am on Saturday, 2/18/17 [facility].  Spoke with patient at bedside to inform him that he is scheduled to [facility] Saturday, [facility]/18/17 at 8:30am for 9am appointment to have IV antibiotic and instruction sheet from [facility] given to patient.  Patient's appointment time entered on his appointment section for his AVS at discharge.  Patient verbalized understanding.  CCP will continue to follow.....Emily Celis RN,CCP                     Discharge Codes     None            Emily Celis RN

## 2017-02-16 NOTE — PROGRESS NOTES
Continued Stay Note  [facility]     Patient Name: Tre Owens  MRN: [id number]  Today's Date: 2/16/2017    Admit Date: 2/13/2017          Discharge Plan       02/16/17 1107    Case Management/Social Work Plan    Plan Home with family that lives near him and will f/u with [facility] for daily IV antibiotics    Additional Comments Spoke with Dr. Faustin and he states patient has agreed to come to [facility] daily for IV antibiotics.  Spoke with patient at bedside and he states he is agreeable to come to [facility] for his daily IV antibiotic.  CCP will schedule this when directed by Dr. Faustin as  when start date will be- he is anticipating possible discharge on Monday.  CCP will continue to follow.....Emily Celis RN,CCP               Discharge Codes     None            Emily Celis RN

## 2017-02-16 NOTE — PLAN OF CARE
Problem: Patient Care Overview (Adult)  Goal: Plan of Care Review  Outcome: Ongoing (interventions implemented as appropriate)    02/16/17 0049   Coping/Psychosocial Response Interventions   Plan Of Care Reviewed With patient   Patient Care Overview   Progress no change         Problem: Pain, Acute (Adult)  Goal: Identify Related Risk Factors and Signs and Symptoms  Outcome: Ongoing (interventions implemented as appropriate)  Goal: Acceptable Pain Control/Comfort Level  Outcome: Ongoing (interventions implemented as appropriate)    02/16/17 0049   Pain, Acute (Adult)   Acceptable Pain Control/Comfort Level making progress toward outcome         Problem: Cellulitis (Adult)  Goal: Signs and Symptoms of Listed Potential Problems Will be Absent or Manageable (Cellulitis)  Outcome: Ongoing (interventions implemented as appropriate)    02/16/17 0049   Cellulitis   Problems Assessed (Cellulitis) all   Problems Present (Cellulitis) pain;infection;situational response

## 2017-02-16 NOTE — DISCHARGE INSTR - APPOINTMENTS
Appointment with [facility] scheduled for Saturday, 2/18/17 at 9am-  Please be there by 8:30am and plan to be there for 21/2 hours.

## 2017-02-16 NOTE — PROGRESS NOTES
LOS: 3 days     Chief Complaint:  Follow-up MSSA bacteremia    Interval History:  No acute events. His sharp lower back pain is improved today with oral pain medications. No longer needing Dilaudid. He is tolerating antibiotics w/o rash or diarrhea. There is no need for surgical drainage of his arm.     ROS: no n/v/d; no CP or SOA    Vital Signs  Temp:  [97.1 °F (36.2 °C)-99.9 °F (37.7 °C)] 97.1 °F (36.2 °C)  Heart Rate:  [] 76  Resp:  [16-20] 18  BP: (111-115)/(68-75) 115/72    Physical Exam:  General: awake, alert  Head: Normocephalic  Eyes: PERRL, EOMI, no scleral icterus  ENT: MMM, OP clear, no thrush. Good dentition.   Neck: Supple  Cardiovascular: NR, RR, no murmurs, rubs, or gallops;no LE edema  Respiratory: normal work of breathing on ambient air  GI: Abdomen is soft, non-tender, non-distended  : no Cai catheter present  Musculoskeletal: L AC fossa erythema and swelling are improved, R thumb erythema improved  Skin: No rashes, lesions, or embolic phenomenon  Neurological: Alert and oriented x 3, motor strength 5/5 in all four extremities  Psychiatric: Normal mood and affect   Vasc: no cyanosis; PIV w/o erythema    Antibiotics:  Nafcillin 2 g IV q4h    LABS:  CBC, CMP, micro reviewed today  Lab Results   Component Value Date    WBC 12.77 (H) 02/16/2017    HGB 10.6 (L) 02/16/2017    HCT 32.0 (L) 02/16/2017    MCV 95.0 02/16/2017     02/16/2017     Lab Results   Component Value Date    GLUCOSE 80 02/16/2017    BUN 9 02/16/2017    CREATININE 0.99 02/16/2017    EGFRIFNONA 77 02/16/2017    EGFRIFAFRI  09/20/2016      Comment:      <15 Indicative of kidney failure.    BCR 9.1 02/16/2017    CO2 24.3 02/16/2017    CALCIUM 8.5 (L) 02/16/2017    ALBUMIN 3.00 (L) 02/16/2017    LABIL2 0.9 02/16/2017    AST 22 02/16/2017    ALT 30 02/16/2017    CRP 5.46 (H) 02/13/2017     Microbiology:  2/13 BCx: MSSA  2/13 UCx: MSSA  2/15 BCx: NGTD    Prior Radiology:  MRI negative for epidural abscess or  osteomyelitis  Arm US negative for abscess    Assessment/Plan   1. MSSA septicemia secondary to IV cocaine abuse - new problem  -continue nafcillin 2 g IV q4h  -TTE was negative  -no need for surgical intervention at L AC fossa site  -repeat blood cultures 2/15 are NGTD  -he need 4 weeks of treatment with stop date 3/15/17  -I will keep him on nafcillin while in the hospital but at discharge we will arrange for him to go to the ACU for daptomycin 8 mg/kg (620 mg) q24h  -he is agreeable to daily ACU visits    2. IVDU  -HIV and Hep C negative  -can't go home w/ PICC; see #1    3. Lower back pain  -MRI negative for epidural abscess or osteomyelitis    4. L AC fossa abscess and R thumb cellulitis  -Arm US negative for abscess  -hand surgery following    5. . EtOH abuse - no signs of withdrawal     Thank you for this consult. ID will follow.

## 2017-02-16 NOTE — PROGRESS NOTES
Patient Care Team:  Kishor Camacho MD as PCP - General  Kishor Camacho MD as PCP - Family Medicine    Chief complaint   Feeling pain over L AC and R thumb is better; still hurts on his back    History of present illness:   Patient continues to feel better; still has some pain over L AC area.    Past Medical History   Diagnosis Date   • Alcoholism    • Kidney failure      Past Surgical History   Procedure Laterality Date   • Appendectomy         melatonin 5 mg Oral Nightly   nafcillin 2 g Intravenous Q4H     Allergies:   Review of patient's allergies indicates no known allergies.      Vital Signs   Temp:  [97.4 °F (36.3 °C)-99.9 °F (37.7 °C)] 99.4 °F (37.4 °C)  Heart Rate:  [] 91  Resp:  [16-18] 18  BP: (111-135)/(58-69) 111/68      Physical Exam:     General Appearance:    Alert, cooperative, in no acute distress   Head:    Normocephalic, without obvious abnormality, atraumatic   Eyes:            Lids and lashes normal, conjunctivae and sclerae normal, no   icterus, no pallor, corneas clear, PERRLA   Ears:    Ears appear intact with no abnormalities noted   Throat:   No oral lesions, no thrush, oral mucosa moist   Neck:   No adenopathy, supple, trachea midline, no thyromegaly, no   carotid bruit, no JVD   Back:     No kyphosis present, no scoliosis present, no skin lesions,      erythema or scars, no tenderness to percussion or                   palpation,   range of motion normal   Lungs:     Clear to auscultation,respirations regular, even and                  unlabored    Heart:    Regular rhythm and normal rate, normal S1 and S2, no            murmur, no gallop, no rub, no click   Abdomen:     Normal bowel sounds, no masses, no organomegaly, soft        non-tender, non-distended, no guarding, no rebound                tenderness   Rectal:     Deferred    Extremities:   Moves all extremities well, no edema, no cyanosis, no             redness   EUE: viable with good blanching and capillary refills;  Still has an area of induration 2x3 cm on volar forearm just distal to AC; no fluid fluctuation; mild tenderness  R thumb: minimal redness and tenderness over dorsal thumb; full ROM       Results Review:   I reviewed the patient's new clinical results.  2/14/2017: US L elbow:  FINDINGS:  1. Real-time imaging demonstrates diffuse edema but I see no formed  abscess, no focal fluid collection.    Principal Problem:    Sepsis  Active Problems:    Low back pain    Drug abuse, IVDA cocaine    Cellulitis of upper extremity      Impression:  1. IVDA with infection over L AC area and cellulitis over R thumb.    Plans:  1. Overall patient is making good progress. However, although US did not show abscess formation, the induration over L volar proximal forearm is quite concerning. Will watch it closely. No plan for surgical intervention at this moment.  2. Currently patient is on Nafcillin and likely will need it for 4 weeks per ID service recommendation.  3. Patient can be discharged from hand surgery standpoint. Will see in office in 1 week or so. (phone number for appointment: [phone])    Chevy Sousa MD  02/15/17  8:10 PM

## 2017-02-16 NOTE — PROGRESS NOTES
" LOS: 3 days   Primary Care Physician: Kishor Camacho MD     Subjective  Feeling a little bit better.  Walked around in the room a little more today.  Tolerating diet.    Vital Signs  Body mass index is 22.6 kg/(m^2).  Temp:  [97.1 °F (36.2 °C)-99.9 °F (37.7 °C)] 97.5 °F (36.4 °C)  Heart Rate:  [] 85  Resp:  [16-20] 18  BP: (111-115)/(68-75) 112/74      Objective:  General Appearance:  In no acute distress.    Vital signs: (most recent): Blood pressure 112/74, pulse 85, temperature 97.5 °F (36.4 °C), temperature source Oral, resp. rate 18, height 73\" (185.4 cm), weight 171 lb 4.8 oz (77.7 kg), SpO2 97 %.    Lungs:  There are decreased breath sounds.  No wheezes, rales or rhonchi.    Heart: Normal rate.  Regular rhythm.  No murmur.   Abdomen: Abdomen is soft and non-distended.  Bowel sounds are normal.   There is no abdominal tenderness.   There is no splenomegaly. There is no hepatomegaly.   Extremities: There is no dependent edema.  (Decreased erythema and tenderness at base of the right thumb.  Decreased induration and erythema left antecubital fossa.)  Neurological: Patient is alert.          Results Review:    I reviewed the patient's new clinical results.      Results from last 7 days  Lab Units 02/16/17  0343 02/15/17  0456   WBC 10*3/mm3 12.77* 15.45*   HEMOGLOBIN g/dL 10.6* 10.8*   PLATELETS 10*3/mm3 247 225       Results from last 7 days  Lab Units 02/16/17  0343 02/15/17  0456   SODIUM mmol/L 139 135*   POTASSIUM mmol/L 3.5 3.4*   CHLORIDE mmol/L 99 99   TOTAL CO2 mmol/L 24.3 21.7*   BUN mg/dL 9 10   CREATININE mg/dL 0.99 0.84   CALCIUM mg/dL 8.5* 8.1*   GLUCOSE mg/dL 80 87         Hemoglobin A1C:No results found for: HGBA1C    Glucose Range:No results found for: POCGLU    Medication Review: Yes    Assessment/Plan     Active Hospital Problems (** Indicates Principal Problem)    Diagnosis Date Noted   • **MSSA (methicillin susceptible Staphylococcus aureus) septicemia [A41.01] 02/16/2017   • Low " back pain [M54.5] 02/14/2017   • Drug abuse, IVDA cocaine [F19.10] 02/14/2017   • Cellulitis of upper extremity [L03.119] 02/14/2017   • Sepsis [A41.9] 02/13/2017      Resolved Hospital Problems    Diagnosis Date Noted Date Resolved   No resolved problems to display.       Assessment & Plan  1.  MSSA bacteremia with cellulitis right thumb base and left antecubital fossa.  Improving.  IV nafcillin while here and then changed to IV daptomycin.  Discussed with Dr. Faustin- need to wait at least 48 hours from repeat cultures yesterday.  2.  Alcoholism, no withdrawal  3.  IV drug usage.  Likely tomorrow narcotics can be stopped as induration/inflammation improving.    Disposition: Home soon    Lyndsay Alfonso MD  02/16/17  2:53 PM

## 2017-02-17 PROBLEM — A41.9 SEPTICEMIA (HCC): Status: ACTIVE | Noted: 2017-02-17

## 2017-02-17 LAB
ANION GAP SERPL CALCULATED.3IONS-SCNC: 11.2 MMOL/L
BUN BLD-MCNC: 8 MG/DL (ref 8–23)
BUN/CREAT SERPL: 9.1 (ref 7–25)
CALCIUM SPEC-SCNC: 8.6 MG/DL (ref 8.6–10.5)
CHLORIDE SERPL-SCNC: 99 MMOL/L (ref 98–107)
CO2 SERPL-SCNC: 28.8 MMOL/L (ref 22–29)
CREAT BLD-MCNC: 0.88 MG/DL (ref 0.76–1.27)
DEPRECATED RDW RBC AUTO: 55.9 FL (ref 37–54)
ERYTHROCYTE [DISTWIDTH] IN BLOOD BY AUTOMATED COUNT: 15.9 % (ref 11.5–14.5)
GFR SERPL CREATININE-BSD FRML MDRD: 88 ML/MIN/1.73
GLUCOSE BLD-MCNC: 81 MG/DL (ref 65–99)
HCT VFR BLD AUTO: 30.2 % (ref 40.4–52.2)
HGB BLD-MCNC: 10.2 G/DL (ref 13.7–17.6)
MCH RBC QN AUTO: 32 PG (ref 27–32.7)
MCHC RBC AUTO-ENTMCNC: 33.8 G/DL (ref 32.6–36.4)
MCV RBC AUTO: 94.7 FL (ref 79.8–96.2)
PLATELET # BLD AUTO: 255 10*3/MM3 (ref 140–500)
PMV BLD AUTO: 10.1 FL (ref 6–12)
POTASSIUM BLD-SCNC: 3.4 MMOL/L (ref 3.5–5.2)
RBC # BLD AUTO: 3.19 10*6/MM3 (ref 4.6–6)
SODIUM BLD-SCNC: 139 MMOL/L (ref 136–145)
WBC NRBC COR # BLD: 7.48 10*3/MM3 (ref 4.5–10.7)

## 2017-02-17 PROCEDURE — 85027 COMPLETE CBC AUTOMATED: CPT | Performed by: INTERNAL MEDICINE

## 2017-02-17 PROCEDURE — 80048 BASIC METABOLIC PNL TOTAL CA: CPT | Performed by: INTERNAL MEDICINE

## 2017-02-17 PROCEDURE — 99232 SBSQ HOSP IP/OBS MODERATE 35: CPT | Performed by: INTERNAL MEDICINE

## 2017-02-17 PROCEDURE — 25010000002 ENOXAPARIN PER 10 MG: Performed by: INTERNAL MEDICINE

## 2017-02-17 RX ORDER — POTASSIUM CHLORIDE 750 MG/1
20 CAPSULE, EXTENDED RELEASE ORAL ONCE
Status: COMPLETED | OUTPATIENT
Start: 2017-02-17 | End: 2017-02-17

## 2017-02-17 RX ORDER — POLYETHYLENE GLYCOL 3350 17 G/17G
17 POWDER, FOR SOLUTION ORAL DAILY
Status: DISCONTINUED | OUTPATIENT
Start: 2017-02-17 | End: 2017-02-19 | Stop reason: HOSPADM

## 2017-02-17 RX ORDER — DOCUSATE SODIUM 250 MG
250 CAPSULE ORAL 2 TIMES DAILY
Status: DISCONTINUED | OUTPATIENT
Start: 2017-02-17 | End: 2017-02-19 | Stop reason: HOSPADM

## 2017-02-17 RX ORDER — LACTULOSE 10 G/15ML
10 SOLUTION ORAL 3 TIMES DAILY PRN
Status: DISCONTINUED | OUTPATIENT
Start: 2017-02-17 | End: 2017-02-19 | Stop reason: HOSPADM

## 2017-02-17 RX ORDER — BISACODYL 10 MG
10 SUPPOSITORY, RECTAL RECTAL DAILY PRN
Status: DISCONTINUED | OUTPATIENT
Start: 2017-02-17 | End: 2017-02-19 | Stop reason: HOSPADM

## 2017-02-17 RX ORDER — NAPROXEN 500 MG/1
500 TABLET ORAL 2 TIMES DAILY WITH MEALS
Status: DISCONTINUED | OUTPATIENT
Start: 2017-02-17 | End: 2017-02-19 | Stop reason: HOSPADM

## 2017-02-17 RX ADMIN — NAPROXEN 500 MG: 500 TABLET ORAL at 10:33

## 2017-02-17 RX ADMIN — DOCUSATE SODIUM 250 MG: 250 CAPSULE, LIQUID FILLED ORAL at 17:25

## 2017-02-17 RX ADMIN — HYDROCODONE BITARTRATE AND ACETAMINOPHEN 1 TABLET: 7.5; 325 TABLET ORAL at 10:33

## 2017-02-17 RX ADMIN — NAFCILLIN SODIUM 2 G: 2 INJECTION, POWDER, LYOPHILIZED, FOR SOLUTION INTRAMUSCULAR; INTRAVENOUS at 20:51

## 2017-02-17 RX ADMIN — LORAZEPAM 0.5 MG: 0.5 TABLET ORAL at 11:57

## 2017-02-17 RX ADMIN — NAFCILLIN SODIUM 2 G: 2 INJECTION, POWDER, LYOPHILIZED, FOR SOLUTION INTRAMUSCULAR; INTRAVENOUS at 11:52

## 2017-02-17 RX ADMIN — HYDROCODONE BITARTRATE AND ACETAMINOPHEN 1 TABLET: 7.5; 325 TABLET ORAL at 19:25

## 2017-02-17 RX ADMIN — HYDROCODONE BITARTRATE AND ACETAMINOPHEN 1 TABLET: 7.5; 325 TABLET ORAL at 05:15

## 2017-02-17 RX ADMIN — POLYETHYLENE GLYCOL 3350 17 G: 17 POWDER, FOR SOLUTION ORAL at 10:33

## 2017-02-17 RX ADMIN — NAFCILLIN SODIUM 2 G: 2 INJECTION, POWDER, LYOPHILIZED, FOR SOLUTION INTRAMUSCULAR; INTRAVENOUS at 16:53

## 2017-02-17 RX ADMIN — NAFCILLIN SODIUM 2 G: 2 INJECTION, POWDER, LYOPHILIZED, FOR SOLUTION INTRAMUSCULAR; INTRAVENOUS at 03:09

## 2017-02-17 RX ADMIN — DOCUSATE SODIUM 100 MG: 100 CAPSULE, LIQUID FILLED ORAL at 07:41

## 2017-02-17 RX ADMIN — NAFCILLIN SODIUM 2 G: 2 INJECTION, POWDER, LYOPHILIZED, FOR SOLUTION INTRAMUSCULAR; INTRAVENOUS at 07:40

## 2017-02-17 RX ADMIN — ENOXAPARIN SODIUM 40 MG: 40 INJECTION SUBCUTANEOUS at 20:51

## 2017-02-17 RX ADMIN — LORAZEPAM 0.5 MG: 0.5 TABLET ORAL at 03:10

## 2017-02-17 RX ADMIN — LORAZEPAM 0.5 MG: 0.5 TABLET ORAL at 20:51

## 2017-02-17 RX ADMIN — POTASSIUM CHLORIDE 20 MEQ: 750 CAPSULE, EXTENDED RELEASE ORAL at 10:33

## 2017-02-17 RX ADMIN — Medication 5 MG: at 20:51

## 2017-02-17 RX ADMIN — NAPROXEN 500 MG: 500 TABLET ORAL at 17:25

## 2017-02-17 RX ADMIN — NAFCILLIN SODIUM 2 G: 2 INJECTION, POWDER, LYOPHILIZED, FOR SOLUTION INTRAMUSCULAR; INTRAVENOUS at 01:21

## 2017-02-17 RX ADMIN — HYDROCODONE BITARTRATE AND ACETAMINOPHEN 1 TABLET: 7.5; 325 TABLET ORAL at 01:25

## 2017-02-17 NOTE — PROGRESS NOTES
LOS: 4 days     Chief Complaint:  Follow-up MSSA bacteremia    Interval History:  No acute events. His sharp lower back pain is a little worse today with associated constipation. He has some abdominal bloating.  No longer needing Dilaudid. He is tolerating antibiotics w/o rash. There is no need for surgical drainage of his arm. He doesn't think he can be discharged today.     ROS: no n/v/d; no CP or SOA    Vital Signs  Temp:  [97.1 °F (36.2 °C)-99.4 °F (37.4 °C)] 98.5 °F (36.9 °C)  Heart Rate:  [76-89] 79  Resp:  [16-18] 18  BP: (112-129)/(61-85) 120/61    Physical Exam:  General: awake, alert  Head: Normocephalic  Eyes: PERRL, EOMI, no scleral icterus  ENT: MMM, OP clear, no thrush. Good dentition.   Neck: Supple  Cardiovascular: NR, RR, no murmurs, rubs, or gallops;no LE edema  Respiratory: normal work of breathing on ambient air  GI: Abdomen is soft, mildly tender, mildly distended  : no Cai catheter present  Musculoskeletal: L AC fossa erythema and swelling are improved, R thumb erythema improved  Skin: No rashes, lesions, or embolic phenomenon  Neurological: Alert and oriented x 3, motor strength 5/5 in all four extremities  Psychiatric: Normal mood and affect   Vasc: no cyanosis; PIV w/o erythema    Antibiotics:  Nafcillin 2 g IV q4h    LABS:  CBC, CMP, micro reviewed today  Lab Results   Component Value Date    WBC 7.48 02/17/2017    HGB 10.2 (L) 02/17/2017    HCT 30.2 (L) 02/17/2017    MCV 94.7 02/17/2017     02/17/2017     Lab Results   Component Value Date    GLUCOSE 81 02/17/2017    BUN 8 02/17/2017    CREATININE 0.88 02/17/2017    EGFRIFNONA 88 02/17/2017    EGFRIFAFRI  09/20/2016      Comment:      <15 Indicative of kidney failure.    BCR 9.1 02/17/2017    CO2 28.8 02/17/2017    CALCIUM 8.6 02/17/2017    ALBUMIN 3.00 (L) 02/16/2017    LABIL2 0.9 02/16/2017    AST 22 02/16/2017    ALT 30 02/16/2017    CRP 5.46 (H) 02/13/2017     Lab Results   Component Value Date    CKTOTAL 170 10/15/2015     TROPONINT <0.010 09/20/2016       Microbiology:  2/13 BCx: MSSA  2/13 UCx: MSSA  2/15 BCx: NGTD    Prior Radiology:  MRI negative for epidural abscess or osteomyelitis  Arm US negative for abscess    Assessment/Plan   1. MSSA septicemia secondary to IV cocaine abuse - new problem  -continue nafcillin 2 g IV q4h  -TTE was negative  -no need for surgical intervention at L AC fossa site  -repeat blood cultures 2/15 are NGTD  -he need 4 weeks of treatment with stop date 3/15/17  -I will keep him on nafcillin while in the hospital but at discharge we will arrange for him to go to the ACU for daptomycin 8 mg/kg (620 mg) q24h  -he is agreeable to daily ACU visits  -check CK w/ AM labs    2. IVDU  -HIV and Hep C negative  -can't go home w/ PICC; see #1    3. Lower back pain  -MRI negative for epidural abscess or osteomyelitis    4. L AC fossa abscess and R thumb cellulitis  -Arm US negative for abscess  -hand surgery has now signed off    5. . EtOH abuse - no signs of withdrawal     6. Constipation    Thank you for this consult. ID will follow. Case/plan d/w Dr Alfonso.

## 2017-02-17 NOTE — PLAN OF CARE
Problem: Patient Care Overview (Adult)  Goal: Plan of Care Review  Outcome: Ongoing (interventions implemented as appropriate)    02/16/17 0049 02/16/17 1501 02/17/17 1424   Coping/Psychosocial Response Interventions   Plan Of Care Reviewed With --  --  patient   Patient Care Overview   Progress no change --  --    Outcome Evaluation   Outcome Summary/Follow up Plan --  po pain meds, walked around nursing station once, tolerated well --        Goal: Adult Individualization and Mutuality  Outcome: Ongoing (interventions implemented as appropriate)  Goal: Discharge Needs Assessment  Outcome: Ongoing (interventions implemented as appropriate)    Problem: Pain, Acute (Adult)  Goal: Identify Related Risk Factors and Signs and Symptoms  Outcome: Ongoing (interventions implemented as appropriate)  Goal: Acceptable Pain Control/Comfort Level  Outcome: Ongoing (interventions implemented as appropriate)    Problem: Cellulitis (Adult)  Goal: Signs and Symptoms of Listed Potential Problems Will be Absent or Manageable (Cellulitis)  Outcome: Ongoing (interventions implemented as appropriate)

## 2017-02-17 NOTE — PLAN OF CARE
Problem: Patient Care Overview (Adult)  Goal: Plan of Care Review  Outcome: Ongoing (interventions implemented as appropriate)    02/16/17 0049 02/16/17 1418   Coping/Psychosocial Response Interventions   Plan Of Care Reviewed With --  patient   Patient Care Overview   Progress no change --        Goal: Adult Individualization and Mutuality  Outcome: Ongoing (interventions implemented as appropriate)  Goal: Discharge Needs Assessment  Outcome: Ongoing (interventions implemented as appropriate)    02/14/17 1058 02/17/17 0249   Discharge Needs Assessment   Concerns To Be Addressed substance/tobacco abuse/use concerns --    Readmission Within The Last 30 Days --  no previous admission in last 30 days   Equipment Needed After Discharge --  none   Discharge Disposition --  still a patient   Current Health   Anticipated Changes Related to Illness --  none   Self-Care   Equipment Currently Used at Home --  none   Living Environment   Transportation Available --  car;family or friend will provide         Problem: Pain, Acute (Adult)  Goal: Identify Related Risk Factors and Signs and Symptoms  Outcome: Ongoing (interventions implemented as appropriate)    02/17/17 0249   Pain, Acute   Related Risk Factors (Acute Pain) disease process;patient perception   Signs and Symptoms (Acute Pain) sleep pattern alteration;verbalization of pain descriptors         Problem: Cellulitis (Adult)  Goal: Signs and Symptoms of Listed Potential Problems Will be Absent or Manageable (Cellulitis)  Outcome: Ongoing (interventions implemented as appropriate)    02/17/17 0249   Cellulitis   Problems Assessed (Cellulitis) all   Problems Present (Cellulitis) pain;situational response;infection

## 2017-02-17 NOTE — PROGRESS NOTES
Patient Care Team:  Kishor Camacho MD as PCP - General  Kishor Camacho MD as PCP - Family Medicine    Subjective:  Feeling better. No complaint except back still hurts.      Past Medical History   Diagnosis Date   • Alcoholism    • Kidney failure      Past Surgical History   Procedure Laterality Date   • Appendectomy       Allergies:   Review of patient's allergies indicates no known allergies.      Vital Signs   Temp:  [97.1 °F (36.2 °C)-99.6 °F (37.6 °C)] 99.4 °F (37.4 °C)  Heart Rate:  [] 83  Resp:  [16-20] 16  BP: (112-129)/(72-85) 129/85      Physical Exam:     General Appearance:    Alert, cooperative, in no acute distress   Head:    Normocephalic, without obvious abnormality, atraumatic   Eyes:            Lids and lashes normal, conjunctivae and sclerae normal, no   icterus, no pallor, corneas clear, PERRLA   Ears:    Ears appear intact with no abnormalities noted   Throat:   No oral lesions, no thrush, oral mucosa moist   Neck:   No adenopathy, supple, trachea midline, no thyromegaly, no   carotid bruit, no JVD   Back:     No kyphosis present, no scoliosis present, no skin lesions,      erythema or scars, no tenderness to percussion or                   palpation,   range of motion normal   Lungs:     Clear to auscultation,respirations regular, even and                  unlabored    Heart:    Regular rhythm and normal rate, normal S1 and S2, no            murmur, no gallop, no rub, no click   Abdomen:     Normal bowel sounds, no masses, no organomegaly, soft        non-tender, non-distended, no guarding, no rebound                tenderness   Rectal:     Deferred   Extremities:   Moves all extremities well, no edema, no cyanosis, no             redness    LUE: viable with good blanching and capillary refills;  Induration with mild redness 2x3cm on volar  proximal forearm; mild ternderness       Results Review:   I reviewed the patient's new clinical results.      Principal Problem:    MSSA (methicillin susceptible Staphylococcus aureus) septicemia  Active Problems:    Sepsis    Low back pain    Drug abuse, IVDA cocaine    Cellulitis of upper extremity      Impression:  IVDA with L volar forearm cellulitis.    Plans:  1. Plans of IV Abx per ID service as outpatient setting.  2. No plan for surgery at this moment.  3. Will sign off for now. See in office as needed.    Chevy Sousa MD  02/16/17  9:52 PM

## 2017-02-17 NOTE — PROGRESS NOTES
Continued Stay Note  [facility]     Patient Name: Tre Owens  MRN: [id number]  Today's Date: 2/17/2017    Admit Date: 2/13/2017          Discharge Plan       02/17/17 0812    Case Management/Social Work Plan    Plan Home with family that lives near him and will F/U with [facility] daily for IV antibiotics    Patient/Family In Agreement With Plan yes    Additional Comments Spoke with Dr. Faustin and he states that patient will be in the hospital over the weekend and to cancel Saturday's IV antibiotic in [facility].  Spoke with Jennifer in scheduling at ext. [phone] and canceled 2/18/17 appointment. Patient was informed of appointment cancelled.   CCP will reschedule as patient progresses and MD directs when.  CCP will continue to follow.....Emily Celis RN,CCP               Discharge Codes     None            Emily Celis RN

## 2017-02-18 ENCOUNTER — HOSPITAL ENCOUNTER (OUTPATIENT)
Dept: INFUSION THERAPY | Facility: HOSPITAL | Age: 62
Discharge: HOME | End: 2017-02-18
Attending: INTERNAL MEDICINE

## 2017-02-18 LAB
ANION GAP SERPL CALCULATED.3IONS-SCNC: 11.8 MMOL/L
BUN BLD-MCNC: 13 MG/DL (ref 8–23)
BUN/CREAT SERPL: 15.1 (ref 7–25)
CALCIUM SPEC-SCNC: 8.7 MG/DL (ref 8.6–10.5)
CHLORIDE SERPL-SCNC: 103 MMOL/L (ref 98–107)
CK SERPL-CCNC: 25 U/L (ref 20–200)
CO2 SERPL-SCNC: 27.2 MMOL/L (ref 22–29)
CREAT BLD-MCNC: 0.86 MG/DL (ref 0.76–1.27)
DEPRECATED RDW RBC AUTO: 54.3 FL (ref 37–54)
ERYTHROCYTE [DISTWIDTH] IN BLOOD BY AUTOMATED COUNT: 15.9 % (ref 11.5–14.5)
GFR SERPL CREATININE-BSD FRML MDRD: 91 ML/MIN/1.73
GLUCOSE BLD-MCNC: 102 MG/DL (ref 65–99)
HCT VFR BLD AUTO: 30.2 % (ref 40.4–52.2)
HGB BLD-MCNC: 10 G/DL (ref 13.7–17.6)
MCH RBC QN AUTO: 30.9 PG (ref 27–32.7)
MCHC RBC AUTO-ENTMCNC: 33.1 G/DL (ref 32.6–36.4)
MCV RBC AUTO: 93.2 FL (ref 79.8–96.2)
PLATELET # BLD AUTO: 271 10*3/MM3 (ref 140–500)
PMV BLD AUTO: 10.1 FL (ref 6–12)
POTASSIUM BLD-SCNC: 3.4 MMOL/L (ref 3.5–5.2)
RBC # BLD AUTO: 3.24 10*6/MM3 (ref 4.6–6)
SODIUM BLD-SCNC: 142 MMOL/L (ref 136–145)
WBC NRBC COR # BLD: 6.62 10*3/MM3 (ref 4.5–10.7)

## 2017-02-18 PROCEDURE — 99232 SBSQ HOSP IP/OBS MODERATE 35: CPT | Performed by: INTERNAL MEDICINE

## 2017-02-18 PROCEDURE — 85027 COMPLETE CBC AUTOMATED: CPT | Performed by: INTERNAL MEDICINE

## 2017-02-18 PROCEDURE — 80048 BASIC METABOLIC PNL TOTAL CA: CPT | Performed by: INTERNAL MEDICINE

## 2017-02-18 PROCEDURE — 25010000002 ENOXAPARIN PER 10 MG: Performed by: INTERNAL MEDICINE

## 2017-02-18 PROCEDURE — 82550 ASSAY OF CK (CPK): CPT | Performed by: INTERNAL MEDICINE

## 2017-02-18 RX ORDER — METAXALONE 800 MG/1
800 TABLET ORAL 3 TIMES DAILY PRN
Status: DISCONTINUED | OUTPATIENT
Start: 2017-02-18 | End: 2017-02-19 | Stop reason: HOSPADM

## 2017-02-18 RX ORDER — POTASSIUM CHLORIDE 750 MG/1
40 CAPSULE, EXTENDED RELEASE ORAL ONCE
Status: COMPLETED | OUTPATIENT
Start: 2017-02-18 | End: 2017-02-18

## 2017-02-18 RX ADMIN — POTASSIUM CHLORIDE 40 MEQ: 750 CAPSULE, EXTENDED RELEASE ORAL at 14:26

## 2017-02-18 RX ADMIN — NAFCILLIN SODIUM 2 G: 2 INJECTION, POWDER, LYOPHILIZED, FOR SOLUTION INTRAMUSCULAR; INTRAVENOUS at 08:10

## 2017-02-18 RX ADMIN — HYDROCODONE BITARTRATE AND ACETAMINOPHEN 1 TABLET: 7.5; 325 TABLET ORAL at 10:03

## 2017-02-18 RX ADMIN — NAFCILLIN SODIUM 2 G: 2 INJECTION, POWDER, LYOPHILIZED, FOR SOLUTION INTRAMUSCULAR; INTRAVENOUS at 04:15

## 2017-02-18 RX ADMIN — DOCUSATE SODIUM 250 MG: 250 CAPSULE, LIQUID FILLED ORAL at 08:11

## 2017-02-18 RX ADMIN — NAPROXEN 500 MG: 500 TABLET ORAL at 08:12

## 2017-02-18 RX ADMIN — NAFCILLIN SODIUM 2 G: 2 INJECTION, POWDER, LYOPHILIZED, FOR SOLUTION INTRAMUSCULAR; INTRAVENOUS at 11:38

## 2017-02-18 RX ADMIN — POLYETHYLENE GLYCOL 3350 17 G: 17 POWDER, FOR SOLUTION ORAL at 08:11

## 2017-02-18 RX ADMIN — ENOXAPARIN SODIUM 40 MG: 40 INJECTION SUBCUTANEOUS at 20:45

## 2017-02-18 RX ADMIN — Medication 5 MG: at 22:59

## 2017-02-18 RX ADMIN — BISACODYL 10 MG: 5 TABLET, COATED ORAL at 16:09

## 2017-02-18 RX ADMIN — HYDROCODONE BITARTRATE AND ACETAMINOPHEN 1 TABLET: 7.5; 325 TABLET ORAL at 18:26

## 2017-02-18 RX ADMIN — NAFCILLIN SODIUM 2 G: 2 INJECTION, POWDER, LYOPHILIZED, FOR SOLUTION INTRAMUSCULAR; INTRAVENOUS at 00:32

## 2017-02-18 RX ADMIN — NAFCILLIN SODIUM 2 G: 2 INJECTION, POWDER, LYOPHILIZED, FOR SOLUTION INTRAMUSCULAR; INTRAVENOUS at 15:25

## 2017-02-18 RX ADMIN — NAPROXEN 500 MG: 500 TABLET ORAL at 17:08

## 2017-02-18 RX ADMIN — METAXALONE 800 MG: 800 TABLET ORAL at 18:36

## 2017-02-18 RX ADMIN — NAFCILLIN SODIUM 2 G: 2 INJECTION, POWDER, LYOPHILIZED, FOR SOLUTION INTRAMUSCULAR; INTRAVENOUS at 20:45

## 2017-02-18 RX ADMIN — HYDROCODONE BITARTRATE AND ACETAMINOPHEN 1 TABLET: 7.5; 325 TABLET ORAL at 22:58

## 2017-02-18 RX ADMIN — LORAZEPAM 0.5 MG: 0.5 TABLET ORAL at 22:58

## 2017-02-18 RX ADMIN — DOCUSATE SODIUM 250 MG: 250 CAPSULE, LIQUID FILLED ORAL at 17:08

## 2017-02-18 NOTE — PROGRESS NOTES
" LOS: 5 days   Primary Care Physician: Kishor Camacho MD     Subjective  Feels better. Back improving. Wants muscle relaxer. No bm yet. Has been up and about    Vital Signs  Body mass index is 22.6 kg/(m^2).  Temp:  [98.1 °F (36.7 °C)-99.2 °F (37.3 °C)] 98.1 °F (36.7 °C)  Heart Rate:  [78-94] 84  Resp:  [16-20] 16  BP: (108-120)/(58-83) 120/82      Objective:  General Appearance:  In no acute distress.    Vital signs: (most recent): Blood pressure 120/82, pulse 84, temperature 98.1 °F (36.7 °C), temperature source Oral, resp. rate 16, height 73\" (185.4 cm), weight 171 lb 4.8 oz (77.7 kg), SpO2 96 %.    Lungs:  There are decreased breath sounds.  No wheezes, rales or rhonchi.    Heart: Normal rate.  Regular rhythm.  No murmur.   Abdomen: Abdomen is soft and non-distended.  Bowel sounds are normal.   There is no abdominal tenderness.   There is no splenomegaly. There is no hepatomegaly.   Extremities: There is no dependent edema.  (Erythema and induration less at right wrist and left AC fossa)  Neurological: Patient is alert.          Results Review:    I reviewed the patient's new clinical results.       Results from last 7 days  Lab Units 02/18/17  0544 02/17/17  0321   WBC 10*3/mm3 6.62 7.48   HEMOGLOBIN g/dL 10.0* 10.2*   PLATELETS 10*3/mm3 271 255       Results from last 7 days  Lab Units 02/18/17  0544 02/17/17  0321   SODIUM mmol/L 142 139   POTASSIUM mmol/L 3.4* 3.4*   CHLORIDE mmol/L 103 99   TOTAL CO2 mmol/L 27.2 28.8   BUN mg/dL 13 8   CREATININE mg/dL 0.86 0.88   CALCIUM mg/dL 8.7 8.6   GLUCOSE mg/dL 102* 81         Hemoglobin A1C:No results found for: HGBA1C    Glucose Range:No results found for: POCGLU    Medication Review: Yes    Assessment/Plan     Active Hospital Problems (** Indicates Principal Problem)    Diagnosis Date Noted   • **MSSA (methicillin susceptible Staphylococcus aureus) septicemia [A41.01] 02/16/2017   • Low back pain [M54.5] 02/14/2017   • Drug abuse, IVDA cocaine [F19.10] " 02/14/2017   • Cellulitis of upper extremity [L03.119] 02/14/2017   • Sepsis [A41.9] 02/13/2017      Resolved Hospital Problems    Diagnosis Date Noted Date Resolved   No resolved problems to display.       Assessment & Plan  1. MSSA bacteremia from cellulitis right thumb and left AC fossa. Better. Nafcillin till tomorrow then start dapto. Dc after dapto tomorrow  2. Chronic LBP- add skelaxin prn  3. Constipation- mult meds available  4. IVDU hx- no withdrawal  5. Hypokalemia- give 40meq today    Lyndsay Alfonso MD  02/18/17  2:13 PM

## 2017-02-18 NOTE — PROGRESS NOTES
" LOS: 4 days   Primary Care Physician: Kishor Camacho MD     Subjective  Low back is been bothering him more today although it seems a little better right now.  Discussed with Dr. Faustin earlier: Patient did not feel ready for discharge home.  Constipated    Vital Signs  Body mass index is 22.6 kg/(m^2).  Temp:  [98.1 °F (36.7 °C)-99.4 °F (37.4 °C)] 98.1 °F (36.7 °C)  Heart Rate:  [79-94] 94  Resp:  [16-18] 18  BP: (107-129)/(61-95) 107/95      Objective:  General Appearance:  In no acute distress.    Vital signs: (most recent): Blood pressure 107/95, pulse 94, temperature 98.1 °F (36.7 °C), temperature source Oral, resp. rate 18, height 73\" (185.4 cm), weight 171 lb 4.8 oz (77.7 kg), SpO2 96 %.    Lungs:  There are decreased breath sounds.  No wheezes, rales or rhonchi.    Heart: Normal rate.  Regular rhythm.  No murmur.   Abdomen: Abdomen is soft and non-distended.  There is no abdominal tenderness.   There is no splenomegaly. There is no hepatomegaly.   Extremities: There is no dependent edema.  (Erythema and induration decreased at the base of the right thumb.  Induration at the left antecubital fossa looks about the same.  There is mild erythema at that site)  Neurological: Patient is alert.          Results Review:    I reviewed the patient's new clinical results.      Results from last 7 days  Lab Units 02/17/17  0321 02/16/17  0343   WBC 10*3/mm3 7.48 12.77*   HEMOGLOBIN g/dL 10.2* 10.6*   PLATELETS 10*3/mm3 255 247       Results from last 7 days  Lab Units 02/17/17  0321 02/16/17  0343   SODIUM mmol/L 139 139   POTASSIUM mmol/L 3.4* 3.5   CHLORIDE mmol/L 99 99   TOTAL CO2 mmol/L 28.8 24.3   BUN mg/dL 8 9   CREATININE mg/dL 0.88 0.99   CALCIUM mg/dL 8.6 8.5*   GLUCOSE mg/dL 81 80         Hemoglobin A1C:No results found for: HGBA1C    Glucose Range:No results found for: POCGLU    Medication Review: Yes    Assessment/Plan     Active Hospital Problems (** Indicates Principal Problem)    Diagnosis Date " Noted   • **MSSA (methicillin susceptible Staphylococcus aureus) septicemia [A41.01] 02/16/2017   • Low back pain [M54.5] 02/14/2017   • Drug abuse, IVDA cocaine [F19.10] 02/14/2017   • Cellulitis of upper extremity [L03.119] 02/14/2017   • Sepsis [A41.9] 02/13/2017      Resolved Hospital Problems    Diagnosis Date Noted Date Resolved   No resolved problems to display.       Assessment & Plan   1.  MSSA septicemia secondary to cellulitis right thumb and left antecubital fossa.  Continue nafcillin here.  Change to daptomycin 2/20/2017 through ACU   2.  Constipation Colace Dulcolax and MiraLAX ordered as needed  3.  Low back pain.  I added Naprosyn.  Try heat.     4.  Hypokalemia.  20 mEq of potassium given earlier.    Disposition: discharge 2/20/17    discussed earlier with Dr. Ramin Alfonso MD  02/17/17  7:07 PM

## 2017-02-18 NOTE — PROGRESS NOTES
LOS: 5 days     Chief Complaint:  Follow-up MSSA bacteremia    Interval History:  No acute events. His sharp lower back pain is better today. He is asking for a muscle relaxer. He is tolerating antibiotics w/o rash. He is hoping for discharge tomorrow.    ROS: no n/v/d; no CP or SOA    Vital Signs  Temp:  [98.1 °F (36.7 °C)-99.2 °F (37.3 °C)] 99.1 °F (37.3 °C)  Heart Rate:  [78-94] 81  Resp:  [16-20] 16  BP: (107-119)/(58-95) 116/83    Physical Exam:  General: awake, alert  Head: Normocephalic  Eyes: PERRL, EOMI, no scleral icterus  ENT: MMM, OP clear, no thrush. Good dentition.   Neck: Supple  Cardiovascular: NR, RR, no murmurs, rubs, or gallops;no LE edema  Respiratory: normal work of breathing on ambient air  GI: Abdomen is soft, mildly tender, mildly distended  : no Cai catheter present  Musculoskeletal: L AC fossa erythema and swelling are improved, R thumb erythema improved  Skin: No rashes, lesions, or embolic phenomenon  Neurological: Alert and oriented x 3, motor strength 5/5 in all four extremities  Psychiatric: Normal mood and affect   Vasc: no cyanosis; PIV w/o erythema    Antibiotics:  Nafcillin 2 g IV q4h    LABS:  CBC, CMP, micro reviewed today  Lab Results   Component Value Date    WBC 6.62 02/18/2017    HGB 10.0 (L) 02/18/2017    HCT 30.2 (L) 02/18/2017    MCV 93.2 02/18/2017     02/18/2017     Lab Results   Component Value Date    GLUCOSE 102 (H) 02/18/2017    BUN 13 02/18/2017    CREATININE 0.86 02/18/2017    EGFRIFNONA 91 02/18/2017    EGFRIFAFRI  09/20/2016      Comment:      <15 Indicative of kidney failure.    BCR 15.1 02/18/2017    CO2 27.2 02/18/2017    CALCIUM 8.7 02/18/2017    ALBUMIN 3.00 (L) 02/16/2017    LABIL2 0.9 02/16/2017    AST 22 02/16/2017    ALT 30 02/16/2017    CRP 5.46 (H) 02/13/2017     Lab Results   Component Value Date    CKTOTAL 25 02/18/2017    TROPONINT <0.010 09/20/2016       Microbiology:  2/13 BCx: MSSA  2/13 UCx: MSSA  2/15 BCx: NGTD    Prior  Radiology:  MRI negative for epidural abscess or osteomyelitis  Arm US negative for abscess    Assessment/Plan   1. MSSA septicemia secondary to IV cocaine abuse - new problem  -continue nafcillin 2 g IV q4h  -TTE was negative  -no need for surgical intervention at L AC fossa site  -repeat blood cultures 2/15 are NGTD  -he need 4 weeks of treatment with stop date 3/15/17  -I will keep him on nafcillin while in the hospital but at discharge we will arrange for him to go to the ACU for daptomycin 8 mg/kg (620 mg) q24h  -he is agreeable to daily ACU visits  -he wishes for DC tomorrow (after 1st dose of daptomycin)    2. IVDU  -HIV and Hep C negative  -can't go home w/ PICC; see #1    3. Lower back pain  -MRI negative for epidural abscess or osteomyelitis    4. L AC fossa abscess and R thumb cellulitis  -Arm US negative for abscess  -hand surgery has now signed off    5. . EtOH abuse - no signs of withdrawal     6. Constipation    Thank you for this consult. ID will follow. Hopeful DC tomorrow.

## 2017-02-19 ENCOUNTER — APPOINTMENT (OUTPATIENT)
Dept: INFUSION THERAPY | Facility: HOSPITAL | Age: 62
End: 2017-02-19

## 2017-02-19 VITALS
SYSTOLIC BLOOD PRESSURE: 121 MMHG | TEMPERATURE: 98.5 F | RESPIRATION RATE: 16 BRPM | OXYGEN SATURATION: 94 % | DIASTOLIC BLOOD PRESSURE: 85 MMHG | WEIGHT: 171.3 LBS | HEART RATE: 80 BPM | BODY MASS INDEX: 22.7 KG/M2 | HEIGHT: 73 IN

## 2017-02-19 PROBLEM — K59.03 DRUG-INDUCED CONSTIPATION: Status: ACTIVE | Noted: 2017-02-19

## 2017-02-19 PROCEDURE — 25010000002 DAPTOMYCIN PER 1 MG: Performed by: INTERNAL MEDICINE

## 2017-02-19 PROCEDURE — 99232 SBSQ HOSP IP/OBS MODERATE 35: CPT | Performed by: INTERNAL MEDICINE

## 2017-02-19 RX ORDER — NAPROXEN 500 MG/1
500 TABLET ORAL 2 TIMES DAILY WITH MEALS
Qty: 40 TABLET | Refills: 0 | Status: SHIPPED | OUTPATIENT
Start: 2017-02-19

## 2017-02-19 RX ORDER — POLYETHYLENE GLYCOL 3350 17 G/17G
17 POWDER, FOR SOLUTION ORAL DAILY
Start: 2017-02-19

## 2017-02-19 RX ORDER — METAXALONE 800 MG/1
800 TABLET ORAL 3 TIMES DAILY PRN
Qty: 30 TABLET | Refills: 0 | Status: SHIPPED | OUTPATIENT
Start: 2017-02-19

## 2017-02-19 RX ORDER — DOCUSATE SODIUM 250 MG
250 CAPSULE ORAL 2 TIMES DAILY
Refills: 0
Start: 2017-02-19

## 2017-02-19 RX ORDER — ACETAMINOPHEN 325 MG/1
650 TABLET ORAL EVERY 6 HOURS PRN
Refills: 0
Start: 2017-02-19

## 2017-02-19 RX ADMIN — POLYETHYLENE GLYCOL 3350 17 G: 17 POWDER, FOR SOLUTION ORAL at 09:08

## 2017-02-19 RX ADMIN — DOCUSATE SODIUM 250 MG: 250 CAPSULE, LIQUID FILLED ORAL at 09:07

## 2017-02-19 RX ADMIN — NAPROXEN 500 MG: 500 TABLET ORAL at 09:08

## 2017-02-19 RX ADMIN — NAFCILLIN SODIUM 2 G: 2 INJECTION, POWDER, LYOPHILIZED, FOR SOLUTION INTRAMUSCULAR; INTRAVENOUS at 00:33

## 2017-02-19 RX ADMIN — NAFCILLIN SODIUM 2 G: 2 INJECTION, POWDER, LYOPHILIZED, FOR SOLUTION INTRAMUSCULAR; INTRAVENOUS at 09:08

## 2017-02-19 RX ADMIN — NAFCILLIN SODIUM 2 G: 2 INJECTION, POWDER, LYOPHILIZED, FOR SOLUTION INTRAMUSCULAR; INTRAVENOUS at 04:23

## 2017-02-19 RX ADMIN — DAPTOMYCIN 620 MG: 500 INJECTION, POWDER, LYOPHILIZED, FOR SOLUTION INTRAVENOUS at 11:16

## 2017-02-19 NOTE — DISCHARGE SUMMARY
DATE OF ADMISSION: 02/13/2017    DATE OF DISCHARGE:  02/19/2017    DISCHARGE DIAGNOSES: Methicillin-sensitive Staph aureus bacteremia.     OTHER DIAGNOSES:  1.  Cellulitis at the base of the right thumb and the left antecubital fossa.   2.   Intravenous drug abuse.   3.  Chronic low back pain.  4.  Constipation.   5.  Hypokalemia.   6.  Sepsis.     CONSULTANTS:  1.  Vladimir Faustin MD.  2.  Chevy Sousa MD of Hand Surgery.     DIAGNOSTIC DATA: CT abdomen and pelvis without contrast, 2017, which showed 3.6 cm left renal cyst. No hydronephrosis. Large amount of stool in the colon. Please see the report for complete details. Ultrasound of the left antecubital fossa which showed diffuse edema but no formed abscess or focal fluid collection. Echocardiogram 02/14/2017, which showed EF of 64%.     HOSPITAL COURSE: The patient was admitted to our service for back pain and fever. He had been injecting himself with cocaine and had some redness and pain at both arms. He was diagnosed with cellulitis of the upper extremities and started empirically on IV antibiotics. He was seen by Dr. Faustin. Vancomycin and Rocephin were continued. HIV and hepatitis B and C were negative. Hand surgery was consulted. Dr. Sousa recommended the ultrasound of the left antecubital fossa. Fortunately there was no abscess and surgery was not indicated. Antibiotics were changed to nafcillin when methicillin sensitive Staphylococcus aureus was identified. The patient will receive daptomycin starting today and this will continue through the ACU. He is not a candidate for a PICC line secondary to the IV drug abuse. He was seen by [facility], but declined any followup. He has had no withdrawal here. He has had some hypokalemia which has been addressed. He also has constipation and has had medications for that. He is much improved and ready for discharge today after the daptomycin is given.     CONDITION: Stable.     PROGNOSIS: Good with further  drug abstinence.     DIAGNOSTIC DATA: Labs from yesterday, white count 6.6, hemoglobin 10, platelets 271,000. Sodium 142, potassium 3.4, chloride 103, CO2 of 27, BUN 13, creatinine 0.9, glucose 102. CPK on 09/18/2016 was 25.  Hepatitis B and C panel negative.  HIV 1 and 2 nonreactive. HIV P24 antigen screen nonreactive. MRSA nasal swab was positive. Urine culture less than 10,000 colony-forming units of Staphylococcus aureus.  Blood culture with Staphylococcus aureus sensitive to oxacillin and the rest of the panel. MRI of the lumbar spine on 02/14/2017, which showed degenerative disk disease, but no evidence of abscess or lumbar spine infection.     DISCHARGE MEDICATIONS:  1.  Tylenol 325 mg 2 p.o. q.6 h. p.r.n. pain.   2.  Daptomycin 620 mg IV daily through the ACU.  This will continue through 03/15/2017.    3.  Colace 250 mg b.i.d.   4.  Skelaxin 800 mg t.i.d. p.r.n. muscle spasms.   5.  Naproxen 500 mg b.i.d. with meals.   6.  MiraLax 17 g daily.   7.  Melatonin 5 mg at bedtime.   8.  Multivitamin daily.     DISCONTINUED MEDICATION: Testosterone     ACTIVITY: As tolerated.     DIET: Regular.     FOLLOWUP:   1.  Followup with Dr. Camacho in 2 weeks for back pain.   2.  Followup with Dr. Sousa in 1 week.   3.  Daily visits at the ACU for the daptomycin infusion.   4.  Follow up with Dr. Faustin 03/15/2017.    5.  Weekly CBC with diff, CMP and CPK faxed to Dr. Faustin at [phone].    I have discussed the above today with Dr. Faustin. I discussed with the patient and with the nurse. Medical record reviewed. Total time including preparation for discharge was over 35 minutes         Lyndsay Alfonso M.D.  [email or web address]  D:   02/19/2017 10:05:05  T:   02/19/2017 10:43:55  Job ID:   [id number]  Document ID:   [id number]  cc:

## 2017-02-19 NOTE — PROGRESS NOTES
" LOS: 6 days   Primary Care Physician: Kishor Camacho MD     Subjective  Had a good night per nurse.  Patient was sleeping when I came in.  No complaints.  Right thumb and left arm feeling better.    Vital Signs  Body mass index is 22.6 kg/(m^2).  Temp:  [98 °F (36.7 °C)-98.5 °F (36.9 °C)] 98.5 °F (36.9 °C)  Heart Rate:  [80-88] 80  Resp:  [16-18] 16  BP: (116-139)/(70-85) 121/85      Objective:  General Appearance:  In no acute distress.    Vital signs: (most recent): Blood pressure 121/85, pulse 80, temperature 98.5 °F (36.9 °C), temperature source Oral, resp. rate 16, height 73\" (185.4 cm), weight 171 lb 4.8 oz (77.7 kg), SpO2 94 %.    Lungs:  There are decreased breath sounds.  No wheezes, rales or rhonchi.    Heart: Normal rate.  Regular rhythm.  No murmur.   Abdomen: Abdomen is non-distended.    Extremities: (Base of right thumb appears normal.  Decreased induration and tenderness left antecubital fossa)  Neurological: Patient is alert.          Results Review:    I reviewed the patient's new clinical results.      Results from last 7 days  Lab Units 02/18/17  0544 02/17/17  0321   WBC 10*3/mm3 6.62 7.48   HEMOGLOBIN g/dL 10.0* 10.2*   PLATELETS 10*3/mm3 271 255       Results from last 7 days  Lab Units 02/18/17  0544 02/17/17  0321   SODIUM mmol/L 142 139   POTASSIUM mmol/L 3.4* 3.4*   CHLORIDE mmol/L 103 99   TOTAL CO2 mmol/L 27.2 28.8   BUN mg/dL 13 8   CREATININE mg/dL 0.86 0.88   CALCIUM mg/dL 8.7 8.6   GLUCOSE mg/dL 102* 81         Hemoglobin A1C:No results found for: HGBA1C    Glucose Range:No results found for: POCGLU    Medication Review: Yes    Assessment/Plan     Active Hospital Problems (** Indicates Principal Problem)    Diagnosis Date Noted   • **MSSA (methicillin susceptible Staphylococcus aureus) septicemia [A41.01] 02/16/2017   • Low back pain [M54.5] 02/14/2017   • Drug abuse, IVDA cocaine [F19.10] 02/14/2017   • Cellulitis of upper extremity [L03.119] 02/14/2017   • Sepsis [A41.9] " 02/13/2017      Resolved Hospital Problems    Diagnosis Date Noted Date Resolved   No resolved problems to display.       Assessment & Plan  1.  MSSA bacteremia from cellulitis right thumb and left AC fossa.  Changed to daptomycin today with stop date 3/15/17.  He will get his first dose today; outpatient treatment already arranged by Dr. Faustin.  Parish for discharge home after receives daptomycin dose.  2.  Chronic low back pain.  Continue Skelaxin and nonsteroidal.  3.  Constipation, improved  4.  IV drug abuse    Disposition: Home today  [id number]    Lyndsay Alfonso MD  02/19/17  9:40 AM

## 2017-02-19 NOTE — PROGRESS NOTES
LOS: 6 days     Chief Complaint:  Follow-up MSSA bacteremia    Interval History:  No acute events. His sharp lower back pain is better today w/ muscle relaxer. He is tolerating antibiotics w/o rash. He is hoping for discharge today and I agree.    ROS: no n/v/d; no CP or SOA    Vital Signs  Temp:  [98 °F (36.7 °C)-98.5 °F (36.9 °C)] 98.5 °F (36.9 °C)  Heart Rate:  [80-88] 80  Resp:  [16-18] 16  BP: (116-139)/(70-85) 121/85    Physical Exam:  General: awake, alert  Head: Normocephalic  Eyes: PERRL, EOMI, no scleral icterus  ENT: MMM, OP clear, no thrush. Good dentition.   Neck: Supple  Cardiovascular: NR, RR, no murmurs, rubs, or gallops;no LE edema  Respiratory: normal work of breathing on ambient air  GI: Abdomen is soft, mildly tender, mildly distended  : no Cai catheter present  Musculoskeletal: L AC fossa erythema and swelling are improved, R thumb erythema improved  Skin: No rashes, lesions, or embolic phenomenon  Neurological: Alert and oriented x 3, motor strength 5/5 in all four extremities  Psychiatric: Normal mood and affect   Vasc: no cyanosis; PIV w/o erythema    Antibiotics:  Nafcillin 2 g IV q4h    LABS:   micro reviewed today  Lab Results   Component Value Date    WBC 6.62 02/18/2017    HGB 10.0 (L) 02/18/2017    HCT 30.2 (L) 02/18/2017    MCV 93.2 02/18/2017     02/18/2017     Lab Results   Component Value Date    GLUCOSE 102 (H) 02/18/2017    BUN 13 02/18/2017    CREATININE 0.86 02/18/2017    EGFRIFNONA 91 02/18/2017    EGFRIFAFRI  09/20/2016      Comment:      <15 Indicative of kidney failure.    BCR 15.1 02/18/2017    CO2 27.2 02/18/2017    CALCIUM 8.7 02/18/2017    ALBUMIN 3.00 (L) 02/16/2017    LABIL2 0.9 02/16/2017    AST 22 02/16/2017    ALT 30 02/16/2017    CRP 5.46 (H) 02/13/2017     Lab Results   Component Value Date    CKTOTAL 25 02/18/2017    TROPONINT <0.010 09/20/2016       Microbiology:  2/13 BCx: MSSA  2/13 UCx: MSSA  2/15 BCx: NGTD    Prior Radiology:  MRI negative for  epidural abscess or osteomyelitis  Arm US negative for abscess    Assessment/Plan   1. MSSA septicemia secondary to IV cocaine abuse - new problem  -TTE was negative  -stop nafcillin 2 g IV q4h  -start daptomycin 8 mg/kg q24h (620 mg) which he will receive at ACU with stop date 3/15/17 (4 week course)  -he must receive 1 dose here prior to discharge  -weekly CBC w/ diff, CMP, and CK faxed to me at [phone]    2. IVDU  -HIV and Hep C negative  -can't go home w/ PICC; see #1    3. Lower back pain  -MRI negative for epidural abscess or osteomyelitis    4. L AC fossa abscess and R thumb cellulitis  -Arm US negative for abscess  -hand surgery has now signed off    5. EtOH abuse - no signs of withdrawal     6. Constipation    Thank you for this consult. ID will sign off w/ DC plan in place.

## 2017-02-19 NOTE — PLAN OF CARE
Problem: Patient Care Overview (Adult)  Goal: Plan of Care Review  Outcome: Ongoing (interventions implemented as appropriate)    02/19/17 0508   Coping/Psychosocial Response Interventions   Plan Of Care Reviewed With patient   Patient Care Overview   Progress improving         Problem: Pain, Acute (Adult)  Goal: Identify Related Risk Factors and Signs and Symptoms  Outcome: Ongoing (interventions implemented as appropriate)    02/19/17 0508   Pain, Acute   Related Risk Factors (Acute Pain) disease process;patient perception   Signs and Symptoms (Acute Pain) sleep pattern alteration;verbalization of pain descriptors       Goal: Acceptable Pain Control/Comfort Level  Outcome: Ongoing (interventions implemented as appropriate)    02/19/17 0508   Pain, Acute (Adult)   Acceptable Pain Control/Comfort Level making progress toward outcome         Problem: Cellulitis (Adult)  Goal: Signs and Symptoms of Listed Potential Problems Will be Absent or Manageable (Cellulitis)  Outcome: Ongoing (interventions implemented as appropriate)    02/19/17 0508   Cellulitis   Problems Assessed (Cellulitis) all   Problems Present (Cellulitis) pain;situational response;infection

## 2017-02-20 ENCOUNTER — HOSPITAL ENCOUNTER (OUTPATIENT)
Dept: INFUSION THERAPY | Facility: HOSPITAL | Age: 62
Discharge: HOME | End: 2017-02-20
Admitting: INTERNAL MEDICINE

## 2017-02-20 VITALS
OXYGEN SATURATION: 99 % | DIASTOLIC BLOOD PRESSURE: 86 MMHG | HEART RATE: 77 BPM | RESPIRATION RATE: 16 BRPM | TEMPERATURE: 97.7 F | SYSTOLIC BLOOD PRESSURE: 144 MMHG

## 2017-02-20 DIAGNOSIS — A41.9 SEPTICEMIA (HCC): ICD-10-CM

## 2017-02-20 DIAGNOSIS — A41.01 MSSA (METHICILLIN SUSCEPTIBLE STAPHYLOCOCCUS AUREUS) SEPTICEMIA (HCC): ICD-10-CM

## 2017-02-20 LAB
BACTERIA SPEC AEROBE CULT: NORMAL
BACTERIA SPEC AEROBE CULT: NORMAL
BASOPHILS # BLD AUTO: 0.04 10*3/MM3 (ref 0–0.2)
BASOPHILS NFR BLD AUTO: 0.4 % (ref 0–1.5)
DEPRECATED RDW RBC AUTO: 58.5 FL (ref 37–54)
EOSINOPHIL # BLD AUTO: 0.19 10*3/MM3 (ref 0–0.7)
EOSINOPHIL NFR BLD AUTO: 2.1 % (ref 0.3–6.2)
ERYTHROCYTE [DISTWIDTH] IN BLOOD BY AUTOMATED COUNT: 16.9 % (ref 11.5–14.5)
HCT VFR BLD AUTO: 35.4 % (ref 40.4–52.2)
HGB BLD-MCNC: 11.9 G/DL (ref 13.7–17.6)
IMM GRANULOCYTES # BLD: 0.12 10*3/MM3 (ref 0–0.03)
IMM GRANULOCYTES NFR BLD: 1.3 % (ref 0–0.5)
LYMPHOCYTES # BLD AUTO: 1.35 10*3/MM3 (ref 0.9–4.8)
LYMPHOCYTES NFR BLD AUTO: 14.8 % (ref 19.6–45.3)
MCH RBC QN AUTO: 31.9 PG (ref 27–32.7)
MCHC RBC AUTO-ENTMCNC: 33.6 G/DL (ref 32.6–36.4)
MCV RBC AUTO: 94.9 FL (ref 79.8–96.2)
MONOCYTES # BLD AUTO: 0.87 10*3/MM3 (ref 0.2–1.2)
MONOCYTES NFR BLD AUTO: 9.6 % (ref 5–12)
NEUTROPHILS # BLD AUTO: 6.53 10*3/MM3 (ref 1.9–8.1)
NEUTROPHILS NFR BLD AUTO: 71.8 % (ref 42.7–76)
PLATELET # BLD AUTO: 358 10*3/MM3 (ref 140–500)
PMV BLD AUTO: 10.2 FL (ref 6–12)
RBC # BLD AUTO: 3.73 10*6/MM3 (ref 4.6–6)
WBC NRBC COR # BLD: 9.1 10*3/MM3 (ref 4.5–10.7)

## 2017-02-20 PROCEDURE — 36415 COLL VENOUS BLD VENIPUNCTURE: CPT

## 2017-02-20 PROCEDURE — 85025 COMPLETE CBC W/AUTO DIFF WBC: CPT | Performed by: INTERNAL MEDICINE

## 2017-02-20 PROCEDURE — 96365 THER/PROPH/DIAG IV INF INIT: CPT

## 2017-02-20 PROCEDURE — 25010000002 DAPTOMYCIN PER 1 MG: Performed by: INTERNAL MEDICINE

## 2017-02-20 RX ADMIN — DAPTOMYCIN 620 MG: 500 INJECTION, POWDER, LYOPHILIZED, FOR SOLUTION INTRAVENOUS at 09:37

## 2017-02-20 NOTE — PROGRESS NOTES
TOLERATED TREATMENT WELL. PO COFFEE. DIFFICULT TO GET IV ACCESS, GOTTEN ON SECOND ATTEMPT. PATIENT VERY COOPERATIVE WITH CARE. LAB CALLED AFTER PATIENT DC'D HOME FOR REPEAT DRAW OF CMP/CK .  WILL REDRAW TOMORROW.

## 2017-02-20 NOTE — PATIENT INSTRUCTIONS
Daptomycin Infusion  What is this medicine?  DAPTOMYCIN (DAP toe MYE sin) is a lipopeptide antibiotic. It is used to treat certain kinds of bacterial infections. It will not work for colds, flu, or other viral infections.  This medicine may be used for other purposes; ask your health care provider or pharmacist if you have questions.  COMMON BRAND NAME(S): Monique Amaya  What should I tell my health care provider before I take this medicine?  They need to know if you have any of these conditions:  -kidney disease  -an unusual or allergic reaction to daptomycin, other medicines, foods, dyes, or preservatives  -pregnant or trying to get pregnant  -breast-feeding  How should I use this medicine?  This medicine is for infusion into a vein. It is usually given by a health care professional in a hospital or clinic setting.  If you get this medicine at home, you will be taught how to prepare and give this medicine. Use exactly as directed. Take your medicine at regular intervals. Do not take your medicine more often than directed. Take all of your medicine as directed even if you think you are better. Do not skip doses or stop your medicine early.  It is important that you put your used needles and syringes in a special sharps container. Do not put them in a trash can. If you do not have a sharps container, call your pharmacist or healthcare provider to get one.  Talk to your pediatrician regarding the use of this medicine in children. Special care may be needed.  Overdosage: If you think you have taken too much of this medicine contact a poison control center or emergency room at once.  NOTE: This medicine is only for you. Do not share this medicine with others.  What if I miss a dose?  If you miss a dose, take it as soon as you can. If it is almost time for your next dose, take only that dose. Do not take double or extra doses.  What may interact with this medicine?  -birth control pills  -some antibiotics like  tobramycin  This list may not describe all possible interactions. Give your health care provider a list of all the medicines, herbs, non-prescription drugs, or dietary supplements you use. Also tell them if you smoke, drink alcohol, or use illegal drugs. Some items may interact with your medicine.  What should I watch for while using this medicine?  Your condition will be monitored carefully while you are receiving this medicine.  Do not treat diarrhea with over the counter products. Contact your doctor if you have diarrhea that lasts more than 2 days or if it is severe and watery.  What side effects may I notice from receiving this medicine?  Side effects that you should report to your doctor or health care professional as soon as possible:  -allergic reactions like skin rash, itching or hives, swelling of the face, lips, or tongue  -breathing problems  -fever, infection  -high or low blood pressure  -muscle pain  -numb or tingling pain  -trouble passing urine or change in the amount of urine  -unusually tired or weak  -vomiting  Side effects that usually do not require medical attention (report to your doctor or health care professional if they continue or are bothersome):  -constipation or diarrhea  -trouble sleeping  -headache  -nausea  -stomach upset  This list may not describe all possible side effects. Call your doctor for medical advice about side effects. You may report side effects to FDA at 4-108-FDA-0186.  Where should I keep my medicine?  Keep out of the reach of children.  If you are using this medicine at home, you will be instructed on how to store this medicine. Throw away any unused medicine after the expiration date on the label.  NOTE: This sheet is a summary. It may not cover all possible information. If you have questions about this medicine, talk to your doctor, pharmacist, or health care provider.     © 2016, [employer]. (2014-07-25 07:33:48)

## 2017-02-21 ENCOUNTER — HOSPITAL ENCOUNTER (OUTPATIENT)
Dept: INFUSION THERAPY | Facility: HOSPITAL | Age: 62
Discharge: HOME | End: 2017-02-21
Admitting: INTERNAL MEDICINE

## 2017-02-21 VITALS
HEART RATE: 75 BPM | OXYGEN SATURATION: 100 % | SYSTOLIC BLOOD PRESSURE: 143 MMHG | DIASTOLIC BLOOD PRESSURE: 81 MMHG | RESPIRATION RATE: 20 BRPM | TEMPERATURE: 96.5 F

## 2017-02-21 DIAGNOSIS — A41.9 SEPTICEMIA (HCC): ICD-10-CM

## 2017-02-21 DIAGNOSIS — A41.01 MSSA (METHICILLIN SUSCEPTIBLE STAPHYLOCOCCUS AUREUS) SEPTICEMIA (HCC): ICD-10-CM

## 2017-02-21 LAB
ALBUMIN SERPL-MCNC: 3.1 G/DL (ref 3.5–5.2)
ALBUMIN/GLOB SERPL: 0.8 G/DL
ALP SERPL-CCNC: 38 U/L (ref 39–117)
ALT SERPL W P-5'-P-CCNC: 28 U/L (ref 1–41)
ANION GAP SERPL CALCULATED.3IONS-SCNC: 12.3 MMOL/L
AST SERPL-CCNC: 27 U/L (ref 1–40)
BILIRUB SERPL-MCNC: 0.3 MG/DL (ref 0.1–1.2)
BUN BLD-MCNC: 13 MG/DL (ref 8–23)
BUN/CREAT SERPL: 16.9 (ref 7–25)
CALCIUM SPEC-SCNC: 8.9 MG/DL (ref 8.6–10.5)
CHLORIDE SERPL-SCNC: 103 MMOL/L (ref 98–107)
CK SERPL-CCNC: 68 U/L (ref 20–200)
CO2 SERPL-SCNC: 23.7 MMOL/L (ref 22–29)
CREAT BLD-MCNC: 0.77 MG/DL (ref 0.76–1.27)
GFR SERPL CREATININE-BSD FRML MDRD: 103 ML/MIN/1.73
GLOBULIN UR ELPH-MCNC: 3.9 GM/DL
GLUCOSE BLD-MCNC: 80 MG/DL (ref 65–99)
POTASSIUM BLD-SCNC: 4.7 MMOL/L (ref 3.5–5.2)
PROT SERPL-MCNC: 7 G/DL (ref 6–8.5)
SODIUM BLD-SCNC: 139 MMOL/L (ref 136–145)

## 2017-02-21 PROCEDURE — 96365 THER/PROPH/DIAG IV INF INIT: CPT

## 2017-02-21 PROCEDURE — 82550 ASSAY OF CK (CPK): CPT | Performed by: INTERNAL MEDICINE

## 2017-02-21 PROCEDURE — 80053 COMPREHEN METABOLIC PANEL: CPT | Performed by: INTERNAL MEDICINE

## 2017-02-21 PROCEDURE — 36415 COLL VENOUS BLD VENIPUNCTURE: CPT

## 2017-02-21 PROCEDURE — 25010000002 DAPTOMYCIN PER 1 MG: Performed by: INTERNAL MEDICINE

## 2017-02-21 RX ADMIN — DAPTOMYCIN 620 MG: 500 INJECTION, POWDER, LYOPHILIZED, FOR SOLUTION INTRAVENOUS at 09:45

## 2017-02-21 NOTE — PROGRESS NOTES
TOLERATED TREATMENT WELL. DIFFICULT IV START. CAN ONLY USE RIGHT ARM DUE TO LEFT ARM CELLULITIS.  GOTTEN WITH SECOND ATTEMPT, PATIENT VERY COOPERATIVE WITH CARE.  CMP AND CK REDRAWN TODAY PER LAB REQUEST OF YESTERDAY (02-20-17). DC'D AFTER INFUSION AMB.

## 2017-02-22 ENCOUNTER — TELEPHONE (OUTPATIENT)
Dept: INFECTIOUS DISEASES | Facility: CLINIC | Age: 62
End: 2017-02-22

## 2017-02-22 ENCOUNTER — HOSPITAL ENCOUNTER (OUTPATIENT)
Dept: INFUSION THERAPY | Facility: HOSPITAL | Age: 62
Discharge: HOME | End: 2017-02-22
Admitting: INTERNAL MEDICINE

## 2017-02-22 VITALS
HEART RATE: 75 BPM | OXYGEN SATURATION: 100 % | RESPIRATION RATE: 16 BRPM | TEMPERATURE: 97.3 F | SYSTOLIC BLOOD PRESSURE: 152 MMHG | DIASTOLIC BLOOD PRESSURE: 89 MMHG

## 2017-02-22 DIAGNOSIS — A41.9 SEPTICEMIA (HCC): ICD-10-CM

## 2017-02-22 DIAGNOSIS — A41.01 MSSA (METHICILLIN SUSCEPTIBLE STAPHYLOCOCCUS AUREUS) SEPTICEMIA (HCC): ICD-10-CM

## 2017-02-22 PROCEDURE — 96365 THER/PROPH/DIAG IV INF INIT: CPT

## 2017-02-22 PROCEDURE — 25010000002 DAPTOMYCIN PER 1 MG: Performed by: INTERNAL MEDICINE

## 2017-02-22 RX ADMIN — DAPTOMYCIN 620 MG: 500 INJECTION, POWDER, LYOPHILIZED, FOR SOLUTION INTRAVENOUS at 10:46

## 2017-02-22 NOTE — TELEPHONE ENCOUNTER
NICOLA: Patient's mother is in the Hospital in [place], [place]. The name of the hospital is [facility]. He said he was to call and give you that info. In case he needs to travel out there for her--MALIKA,RN

## 2017-02-22 NOTE — PROGRESS NOTES
Pt c/o mild lightheadedness just after IV dc'ed.  Pt sat for approx 5min and felt better. Pt states he has felt this feeling before after the saline infusing. Otherwise pt tolerated infusion well. Pt dc'ed from unit ambulatory at 1144.

## 2017-02-23 ENCOUNTER — HOSPITAL ENCOUNTER (OUTPATIENT)
Dept: INFUSION THERAPY | Facility: HOSPITAL | Age: 62
Discharge: HOME | End: 2017-02-23
Admitting: INTERNAL MEDICINE

## 2017-02-23 VITALS
SYSTOLIC BLOOD PRESSURE: 141 MMHG | TEMPERATURE: 97.9 F | HEART RATE: 82 BPM | OXYGEN SATURATION: 98 % | DIASTOLIC BLOOD PRESSURE: 90 MMHG | RESPIRATION RATE: 20 BRPM

## 2017-02-23 DIAGNOSIS — A41.9 SEPTICEMIA (HCC): ICD-10-CM

## 2017-02-23 DIAGNOSIS — A41.01 MSSA (METHICILLIN SUSCEPTIBLE STAPHYLOCOCCUS AUREUS) SEPTICEMIA (HCC): ICD-10-CM

## 2017-02-23 PROCEDURE — 96365 THER/PROPH/DIAG IV INF INIT: CPT

## 2017-02-23 PROCEDURE — 25010000002 DAPTOMYCIN PER 1 MG: Performed by: INTERNAL MEDICINE

## 2017-02-23 RX ADMIN — DAPTOMYCIN 620 MG: 500 INJECTION, POWDER, LYOPHILIZED, FOR SOLUTION INTRAVENOUS at 10:01

## 2017-02-24 ENCOUNTER — HOSPITAL ENCOUNTER (OUTPATIENT)
Dept: INFUSION THERAPY | Facility: HOSPITAL | Age: 62
Discharge: HOME | End: 2017-02-24
Admitting: INTERNAL MEDICINE

## 2017-02-24 VITALS
RESPIRATION RATE: 16 BRPM | TEMPERATURE: 98.1 F | DIASTOLIC BLOOD PRESSURE: 88 MMHG | HEART RATE: 78 BPM | OXYGEN SATURATION: 99 % | SYSTOLIC BLOOD PRESSURE: 135 MMHG

## 2017-02-24 DIAGNOSIS — A41.01 MSSA (METHICILLIN SUSCEPTIBLE STAPHYLOCOCCUS AUREUS) SEPTICEMIA (HCC): ICD-10-CM

## 2017-02-24 DIAGNOSIS — A41.9 SEPTICEMIA (HCC): ICD-10-CM

## 2017-02-24 PROCEDURE — 96365 THER/PROPH/DIAG IV INF INIT: CPT

## 2017-02-24 PROCEDURE — 25010000002 DAPTOMYCIN PER 1 MG: Performed by: INTERNAL MEDICINE

## 2017-02-24 RX ADMIN — DAPTOMYCIN 620 MG: 500 INJECTION, POWDER, LYOPHILIZED, FOR SOLUTION INTRAVENOUS at 10:31

## 2017-02-25 ENCOUNTER — HOSPITAL ENCOUNTER (OUTPATIENT)
Dept: INFUSION THERAPY | Facility: HOSPITAL | Age: 62
Discharge: HOME | End: 2017-02-25
Admitting: INTERNAL MEDICINE

## 2017-02-25 VITALS
SYSTOLIC BLOOD PRESSURE: 144 MMHG | DIASTOLIC BLOOD PRESSURE: 86 MMHG | TEMPERATURE: 97.4 F | HEART RATE: 71 BPM | OXYGEN SATURATION: 99 % | RESPIRATION RATE: 18 BRPM

## 2017-02-25 DIAGNOSIS — A41.01 MSSA (METHICILLIN SUSCEPTIBLE STAPHYLOCOCCUS AUREUS) SEPTICEMIA (HCC): ICD-10-CM

## 2017-02-25 DIAGNOSIS — A41.9 SEPTICEMIA (HCC): ICD-10-CM

## 2017-02-25 PROCEDURE — 25010000002 DAPTOMYCIN PER 1 MG: Performed by: INTERNAL MEDICINE

## 2017-02-25 PROCEDURE — 96365 THER/PROPH/DIAG IV INF INIT: CPT

## 2017-02-25 RX ADMIN — DAPTOMYCIN 620 MG: 500 INJECTION, POWDER, LYOPHILIZED, FOR SOLUTION INTRAVENOUS at 09:41

## 2017-02-26 ENCOUNTER — HOSPITAL ENCOUNTER (OUTPATIENT)
Dept: INFUSION THERAPY | Facility: HOSPITAL | Age: 62
Discharge: HOME | End: 2017-02-26
Admitting: INTERNAL MEDICINE

## 2017-02-26 VITALS
OXYGEN SATURATION: 99 % | RESPIRATION RATE: 18 BRPM | TEMPERATURE: 98.3 F | SYSTOLIC BLOOD PRESSURE: 150 MMHG | DIASTOLIC BLOOD PRESSURE: 88 MMHG | HEART RATE: 92 BPM

## 2017-02-26 DIAGNOSIS — A41.9 SEPTICEMIA (HCC): ICD-10-CM

## 2017-02-26 DIAGNOSIS — A41.01 MSSA (METHICILLIN SUSCEPTIBLE STAPHYLOCOCCUS AUREUS) SEPTICEMIA (HCC): ICD-10-CM

## 2017-02-26 PROCEDURE — 96365 THER/PROPH/DIAG IV INF INIT: CPT

## 2017-02-26 PROCEDURE — 25010000002 DAPTOMYCIN PER 1 MG: Performed by: INTERNAL MEDICINE

## 2017-02-26 RX ADMIN — DAPTOMYCIN 620 MG: 500 INJECTION, POWDER, LYOPHILIZED, FOR SOLUTION INTRAVENOUS at 09:25

## 2017-02-27 ENCOUNTER — HOSPITAL ENCOUNTER (OUTPATIENT)
Dept: INFUSION THERAPY | Facility: HOSPITAL | Age: 62
Discharge: HOME | End: 2017-02-27
Admitting: INTERNAL MEDICINE

## 2017-02-27 VITALS
HEART RATE: 89 BPM | DIASTOLIC BLOOD PRESSURE: 70 MMHG | RESPIRATION RATE: 20 BRPM | TEMPERATURE: 98.7 F | SYSTOLIC BLOOD PRESSURE: 125 MMHG | OXYGEN SATURATION: 100 %

## 2017-02-27 DIAGNOSIS — A41.9 SEPTICEMIA (HCC): ICD-10-CM

## 2017-02-27 DIAGNOSIS — A41.01 MSSA (METHICILLIN SUSCEPTIBLE STAPHYLOCOCCUS AUREUS) SEPTICEMIA (HCC): ICD-10-CM

## 2017-02-27 LAB
ALBUMIN SERPL-MCNC: 3.7 G/DL (ref 3.5–5.2)
ALBUMIN/GLOB SERPL: 0.9 G/DL
ALP SERPL-CCNC: 45 U/L (ref 39–117)
ALT SERPL W P-5'-P-CCNC: 58 U/L (ref 1–41)
ANION GAP SERPL CALCULATED.3IONS-SCNC: 14.5 MMOL/L
AST SERPL-CCNC: 49 U/L (ref 1–40)
BASOPHILS # BLD AUTO: 0.03 10*3/MM3 (ref 0–0.2)
BASOPHILS NFR BLD AUTO: 0.3 % (ref 0–1.5)
BILIRUB SERPL-MCNC: 0.6 MG/DL (ref 0.1–1.2)
BUN BLD-MCNC: 17 MG/DL (ref 8–23)
BUN/CREAT SERPL: 17.3 (ref 7–25)
CALCIUM SPEC-SCNC: 9.7 MG/DL (ref 8.6–10.5)
CHLORIDE SERPL-SCNC: 98 MMOL/L (ref 98–107)
CK SERPL-CCNC: 285 U/L (ref 20–200)
CO2 SERPL-SCNC: 24.5 MMOL/L (ref 22–29)
CREAT BLD-MCNC: 0.98 MG/DL (ref 0.76–1.27)
DEPRECATED RDW RBC AUTO: 60.3 FL (ref 37–54)
EOSINOPHIL # BLD AUTO: 0.04 10*3/MM3 (ref 0–0.7)
EOSINOPHIL NFR BLD AUTO: 0.4 % (ref 0.3–6.2)
ERYTHROCYTE [DISTWIDTH] IN BLOOD BY AUTOMATED COUNT: 17 % (ref 11.5–14.5)
GFR SERPL CREATININE-BSD FRML MDRD: 78 ML/MIN/1.73
GLOBULIN UR ELPH-MCNC: 3.9 GM/DL
GLUCOSE BLD-MCNC: 88 MG/DL (ref 65–99)
HCT VFR BLD AUTO: 37.6 % (ref 40.4–52.2)
HGB BLD-MCNC: 11.9 G/DL (ref 13.7–17.6)
IMM GRANULOCYTES # BLD: 0.05 10*3/MM3 (ref 0–0.03)
IMM GRANULOCYTES NFR BLD: 0.5 % (ref 0–0.5)
LYMPHOCYTES # BLD AUTO: 1.25 10*3/MM3 (ref 0.9–4.8)
LYMPHOCYTES NFR BLD AUTO: 13.3 % (ref 19.6–45.3)
MCH RBC QN AUTO: 30.6 PG (ref 27–32.7)
MCHC RBC AUTO-ENTMCNC: 31.6 G/DL (ref 32.6–36.4)
MCV RBC AUTO: 96.7 FL (ref 79.8–96.2)
MONOCYTES # BLD AUTO: 0.69 10*3/MM3 (ref 0.2–1.2)
MONOCYTES NFR BLD AUTO: 7.3 % (ref 5–12)
NEUTROPHILS # BLD AUTO: 7.36 10*3/MM3 (ref 1.9–8.1)
NEUTROPHILS NFR BLD AUTO: 78.2 % (ref 42.7–76)
PLATELET # BLD AUTO: 506 10*3/MM3 (ref 140–500)
PMV BLD AUTO: 9.6 FL (ref 6–12)
POTASSIUM BLD-SCNC: 4.3 MMOL/L (ref 3.5–5.2)
PROT SERPL-MCNC: 7.6 G/DL (ref 6–8.5)
RBC # BLD AUTO: 3.89 10*6/MM3 (ref 4.6–6)
SODIUM BLD-SCNC: 137 MMOL/L (ref 136–145)
WBC NRBC COR # BLD: 9.42 10*3/MM3 (ref 4.5–10.7)

## 2017-02-27 PROCEDURE — 25010000002 DAPTOMYCIN PER 1 MG: Performed by: INTERNAL MEDICINE

## 2017-02-27 PROCEDURE — 80053 COMPREHEN METABOLIC PANEL: CPT | Performed by: INTERNAL MEDICINE

## 2017-02-27 PROCEDURE — 85025 COMPLETE CBC W/AUTO DIFF WBC: CPT | Performed by: INTERNAL MEDICINE

## 2017-02-27 PROCEDURE — 96365 THER/PROPH/DIAG IV INF INIT: CPT

## 2017-02-27 PROCEDURE — 82550 ASSAY OF CK (CPK): CPT | Performed by: INTERNAL MEDICINE

## 2017-02-27 PROCEDURE — 36415 COLL VENOUS BLD VENIPUNCTURE: CPT

## 2017-02-27 RX ADMIN — DAPTOMYCIN 620 MG: 500 INJECTION, POWDER, LYOPHILIZED, FOR SOLUTION INTRAVENOUS at 10:30

## 2017-02-28 ENCOUNTER — HOSPITAL ENCOUNTER (OUTPATIENT)
Dept: INFUSION THERAPY | Facility: HOSPITAL | Age: 62
Discharge: HOME | End: 2017-02-28
Admitting: INTERNAL MEDICINE

## 2017-02-28 VITALS
SYSTOLIC BLOOD PRESSURE: 140 MMHG | RESPIRATION RATE: 20 BRPM | OXYGEN SATURATION: 99 % | TEMPERATURE: 98 F | HEART RATE: 88 BPM | DIASTOLIC BLOOD PRESSURE: 83 MMHG

## 2017-02-28 DIAGNOSIS — A41.01 MSSA (METHICILLIN SUSCEPTIBLE STAPHYLOCOCCUS AUREUS) SEPTICEMIA (HCC): ICD-10-CM

## 2017-02-28 DIAGNOSIS — A41.9 SEPTICEMIA (HCC): ICD-10-CM

## 2017-02-28 PROCEDURE — 96365 THER/PROPH/DIAG IV INF INIT: CPT

## 2017-02-28 PROCEDURE — 25010000002 DAPTOMYCIN PER 1 MG: Performed by: INTERNAL MEDICINE

## 2017-02-28 RX ADMIN — DAPTOMYCIN 620 MG: 500 INJECTION, POWDER, LYOPHILIZED, FOR SOLUTION INTRAVENOUS at 10:57

## 2017-03-01 ENCOUNTER — HOSPITAL ENCOUNTER (OUTPATIENT)
Dept: INFUSION THERAPY | Facility: HOSPITAL | Age: 62
Discharge: HOME | End: 2017-03-01
Admitting: INTERNAL MEDICINE

## 2017-03-01 VITALS
TEMPERATURE: 98.5 F | RESPIRATION RATE: 16 BRPM | SYSTOLIC BLOOD PRESSURE: 142 MMHG | OXYGEN SATURATION: 100 % | DIASTOLIC BLOOD PRESSURE: 91 MMHG | HEART RATE: 79 BPM

## 2017-03-01 DIAGNOSIS — A41.01 MSSA (METHICILLIN SUSCEPTIBLE STAPHYLOCOCCUS AUREUS) SEPTICEMIA (HCC): ICD-10-CM

## 2017-03-01 DIAGNOSIS — A41.9 SEPTICEMIA (HCC): ICD-10-CM

## 2017-03-01 PROCEDURE — 96365 THER/PROPH/DIAG IV INF INIT: CPT

## 2017-03-01 PROCEDURE — 25010000002 DAPTOMYCIN PER 1 MG: Performed by: INTERNAL MEDICINE

## 2017-03-01 RX ADMIN — DAPTOMYCIN 620 MG: 500 INJECTION, POWDER, LYOPHILIZED, FOR SOLUTION INTRAVENOUS at 11:31

## 2017-03-02 ENCOUNTER — HOSPITAL ENCOUNTER (OUTPATIENT)
Dept: INFUSION THERAPY | Facility: HOSPITAL | Age: 62
Discharge: HOME | End: 2017-03-02
Admitting: INTERNAL MEDICINE

## 2017-03-02 VITALS
SYSTOLIC BLOOD PRESSURE: 131 MMHG | DIASTOLIC BLOOD PRESSURE: 71 MMHG | HEART RATE: 91 BPM | RESPIRATION RATE: 20 BRPM | TEMPERATURE: 97.5 F | OXYGEN SATURATION: 100 %

## 2017-03-02 DIAGNOSIS — A41.9 SEPTICEMIA (HCC): ICD-10-CM

## 2017-03-02 DIAGNOSIS — A41.01 MSSA (METHICILLIN SUSCEPTIBLE STAPHYLOCOCCUS AUREUS) SEPTICEMIA (HCC): ICD-10-CM

## 2017-03-02 PROCEDURE — 96365 THER/PROPH/DIAG IV INF INIT: CPT

## 2017-03-02 PROCEDURE — 25010000002 DAPTOMYCIN PER 1 MG: Performed by: INTERNAL MEDICINE

## 2017-03-02 RX ADMIN — DAPTOMYCIN 620 MG: 500 INJECTION, POWDER, LYOPHILIZED, FOR SOLUTION INTRAVENOUS at 11:05

## 2017-03-03 ENCOUNTER — HOSPITAL ENCOUNTER (OUTPATIENT)
Dept: INFUSION THERAPY | Facility: HOSPITAL | Age: 62
Discharge: HOME | End: 2017-03-03
Admitting: INTERNAL MEDICINE

## 2017-03-03 VITALS
SYSTOLIC BLOOD PRESSURE: 147 MMHG | DIASTOLIC BLOOD PRESSURE: 80 MMHG | HEART RATE: 78 BPM | OXYGEN SATURATION: 99 % | TEMPERATURE: 97.7 F | RESPIRATION RATE: 16 BRPM

## 2017-03-03 DIAGNOSIS — A41.01 MSSA (METHICILLIN SUSCEPTIBLE STAPHYLOCOCCUS AUREUS) SEPTICEMIA (HCC): ICD-10-CM

## 2017-03-03 DIAGNOSIS — A41.9 SEPTICEMIA (HCC): ICD-10-CM

## 2017-03-03 PROCEDURE — 96365 THER/PROPH/DIAG IV INF INIT: CPT

## 2017-03-03 PROCEDURE — 25010000002 DAPTOMYCIN PER 1 MG: Performed by: INTERNAL MEDICINE

## 2017-03-03 RX ORDER — ROSUVASTATIN CALCIUM 10 MG/1
10 TABLET, COATED ORAL
COMMUNITY
Start: 2016-03-01

## 2017-03-03 RX ADMIN — DAPTOMYCIN 620 MG: 500 INJECTION, POWDER, LYOPHILIZED, FOR SOLUTION INTRAVENOUS at 10:32

## 2017-03-04 ENCOUNTER — HOSPITAL ENCOUNTER (OUTPATIENT)
Dept: INFUSION THERAPY | Facility: HOSPITAL | Age: 62
Discharge: HOME | End: 2017-03-04
Admitting: INTERNAL MEDICINE

## 2017-03-04 VITALS
HEART RATE: 84 BPM | SYSTOLIC BLOOD PRESSURE: 136 MMHG | TEMPERATURE: 97.2 F | WEIGHT: 175 LBS | DIASTOLIC BLOOD PRESSURE: 85 MMHG | OXYGEN SATURATION: 100 % | BODY MASS INDEX: 23.19 KG/M2 | HEIGHT: 73 IN | RESPIRATION RATE: 18 BRPM

## 2017-03-04 DIAGNOSIS — A41.01 MSSA (METHICILLIN SUSCEPTIBLE STAPHYLOCOCCUS AUREUS) SEPTICEMIA (HCC): ICD-10-CM

## 2017-03-04 DIAGNOSIS — A41.9 SEPTICEMIA (HCC): ICD-10-CM

## 2017-03-04 PROCEDURE — 96365 THER/PROPH/DIAG IV INF INIT: CPT

## 2017-03-04 PROCEDURE — 25010000002 DAPTOMYCIN PER 1 MG: Performed by: INTERNAL MEDICINE

## 2017-03-04 RX ADMIN — DAPTOMYCIN 620 MG: 500 INJECTION, POWDER, LYOPHILIZED, FOR SOLUTION INTRAVENOUS at 10:08

## 2017-03-04 NOTE — PROGRESS NOTES
Infusion complete.  No S/S of reaction, problems or concerns.  IV was flushed with normal saline and D/Willy.  Site held until hemostasis and dressing applied.

## 2017-03-05 ENCOUNTER — HOSPITAL ENCOUNTER (OUTPATIENT)
Dept: INFUSION THERAPY | Facility: HOSPITAL | Age: 62
Discharge: HOME | End: 2017-03-05
Admitting: INTERNAL MEDICINE

## 2017-03-05 VITALS
HEART RATE: 84 BPM | RESPIRATION RATE: 18 BRPM | DIASTOLIC BLOOD PRESSURE: 71 MMHG | TEMPERATURE: 97.5 F | HEIGHT: 73 IN | BODY MASS INDEX: 23.19 KG/M2 | WEIGHT: 175 LBS | SYSTOLIC BLOOD PRESSURE: 118 MMHG | OXYGEN SATURATION: 98 %

## 2017-03-05 DIAGNOSIS — A41.9 SEPTICEMIA (HCC): ICD-10-CM

## 2017-03-05 DIAGNOSIS — A41.01 MSSA (METHICILLIN SUSCEPTIBLE STAPHYLOCOCCUS AUREUS) SEPTICEMIA (HCC): ICD-10-CM

## 2017-03-05 PROCEDURE — 25010000002 DAPTOMYCIN PER 1 MG: Performed by: INTERNAL MEDICINE

## 2017-03-05 PROCEDURE — 96365 THER/PROPH/DIAG IV INF INIT: CPT

## 2017-03-05 RX ADMIN — DAPTOMYCIN 620 MG: 500 INJECTION, POWDER, LYOPHILIZED, FOR SOLUTION INTRAVENOUS at 09:15

## 2017-03-05 NOTE — PROGRESS NOTES
Infusion completed without problems.  IV flushed with N/S and D/Willy.  Patient tolerated without problems or concerns.  He says he is tired and his back is bothering him today but otherwise ok.  Patient ambulated from car to x ray triage and to car after infusion without  Problems.

## 2017-03-06 ENCOUNTER — HOSPITAL ENCOUNTER (OUTPATIENT)
Dept: INFUSION THERAPY | Facility: HOSPITAL | Age: 62
Discharge: HOME | End: 2017-03-06
Admitting: INTERNAL MEDICINE

## 2017-03-06 VITALS
SYSTOLIC BLOOD PRESSURE: 142 MMHG | DIASTOLIC BLOOD PRESSURE: 81 MMHG | RESPIRATION RATE: 16 BRPM | TEMPERATURE: 97.6 F | HEART RATE: 79 BPM | OXYGEN SATURATION: 98 %

## 2017-03-06 DIAGNOSIS — A41.01 MSSA (METHICILLIN SUSCEPTIBLE STAPHYLOCOCCUS AUREUS) SEPTICEMIA (HCC): ICD-10-CM

## 2017-03-06 DIAGNOSIS — A41.9 SEPTICEMIA (HCC): ICD-10-CM

## 2017-03-06 LAB
ALBUMIN SERPL-MCNC: 3.7 G/DL (ref 3.5–5.2)
ALBUMIN/GLOB SERPL: 1 G/DL
ALP SERPL-CCNC: 46 U/L (ref 39–117)
ALT SERPL W P-5'-P-CCNC: 44 U/L (ref 1–41)
ANION GAP SERPL CALCULATED.3IONS-SCNC: 15.4 MMOL/L
AST SERPL-CCNC: 33 U/L (ref 1–40)
BASOPHILS # BLD AUTO: 0.03 10*3/MM3 (ref 0–0.2)
BASOPHILS NFR BLD AUTO: 0.3 % (ref 0–1.5)
BILIRUB SERPL-MCNC: 0.4 MG/DL (ref 0.1–1.2)
BUN BLD-MCNC: 15 MG/DL (ref 8–23)
BUN/CREAT SERPL: 18.3 (ref 7–25)
CALCIUM SPEC-SCNC: 9.4 MG/DL (ref 8.6–10.5)
CHLORIDE SERPL-SCNC: 102 MMOL/L (ref 98–107)
CK SERPL-CCNC: 111 U/L (ref 20–200)
CO2 SERPL-SCNC: 22.6 MMOL/L (ref 22–29)
CREAT BLD-MCNC: 0.82 MG/DL (ref 0.76–1.27)
DEPRECATED RDW RBC AUTO: 55.2 FL (ref 37–54)
EOSINOPHIL # BLD AUTO: 0.16 10*3/MM3 (ref 0–0.7)
EOSINOPHIL NFR BLD AUTO: 1.8 % (ref 0.3–6.2)
ERYTHROCYTE [DISTWIDTH] IN BLOOD BY AUTOMATED COUNT: 15.7 % (ref 11.5–14.5)
GFR SERPL CREATININE-BSD FRML MDRD: 96 ML/MIN/1.73
GLOBULIN UR ELPH-MCNC: 3.6 GM/DL
GLUCOSE BLD-MCNC: 80 MG/DL (ref 65–99)
HCT VFR BLD AUTO: 34.5 % (ref 40.4–52.2)
HGB BLD-MCNC: 11.1 G/DL (ref 13.7–17.6)
IMM GRANULOCYTES # BLD: 0.02 10*3/MM3 (ref 0–0.03)
IMM GRANULOCYTES NFR BLD: 0.2 % (ref 0–0.5)
LYMPHOCYTES # BLD AUTO: 1.11 10*3/MM3 (ref 0.9–4.8)
LYMPHOCYTES NFR BLD AUTO: 12.8 % (ref 19.6–45.3)
MCH RBC QN AUTO: 31.1 PG (ref 27–32.7)
MCHC RBC AUTO-ENTMCNC: 32.2 G/DL (ref 32.6–36.4)
MCV RBC AUTO: 96.6 FL (ref 79.8–96.2)
MONOCYTES # BLD AUTO: 0.81 10*3/MM3 (ref 0.2–1.2)
MONOCYTES NFR BLD AUTO: 9.3 % (ref 5–12)
NEUTROPHILS # BLD AUTO: 6.54 10*3/MM3 (ref 1.9–8.1)
NEUTROPHILS NFR BLD AUTO: 75.6 % (ref 42.7–76)
PLATELET # BLD AUTO: 302 10*3/MM3 (ref 140–500)
PMV BLD AUTO: 10.2 FL (ref 6–12)
POTASSIUM BLD-SCNC: 4.3 MMOL/L (ref 3.5–5.2)
PROT SERPL-MCNC: 7.3 G/DL (ref 6–8.5)
RBC # BLD AUTO: 3.57 10*6/MM3 (ref 4.6–6)
SODIUM BLD-SCNC: 140 MMOL/L (ref 136–145)
WBC NRBC COR # BLD: 8.67 10*3/MM3 (ref 4.5–10.7)

## 2017-03-06 PROCEDURE — 25010000002 DAPTOMYCIN PER 1 MG: Performed by: INTERNAL MEDICINE

## 2017-03-06 PROCEDURE — 82550 ASSAY OF CK (CPK): CPT | Performed by: INTERNAL MEDICINE

## 2017-03-06 PROCEDURE — 80053 COMPREHEN METABOLIC PANEL: CPT | Performed by: INTERNAL MEDICINE

## 2017-03-06 PROCEDURE — 36415 COLL VENOUS BLD VENIPUNCTURE: CPT

## 2017-03-06 PROCEDURE — 96365 THER/PROPH/DIAG IV INF INIT: CPT

## 2017-03-06 PROCEDURE — 85025 COMPLETE CBC W/AUTO DIFF WBC: CPT | Performed by: INTERNAL MEDICINE

## 2017-03-06 RX ADMIN — DAPTOMYCIN 620 MG: 500 INJECTION, POWDER, LYOPHILIZED, FOR SOLUTION INTRAVENOUS at 14:33

## 2017-03-07 ENCOUNTER — HOSPITAL ENCOUNTER (OUTPATIENT)
Dept: INFUSION THERAPY | Facility: HOSPITAL | Age: 62
Discharge: HOME | End: 2017-03-07
Admitting: INTERNAL MEDICINE

## 2017-03-07 VITALS
OXYGEN SATURATION: 100 % | SYSTOLIC BLOOD PRESSURE: 136 MMHG | DIASTOLIC BLOOD PRESSURE: 76 MMHG | RESPIRATION RATE: 20 BRPM | HEART RATE: 87 BPM | TEMPERATURE: 97.5 F

## 2017-03-07 DIAGNOSIS — A41.9 SEPTICEMIA (HCC): ICD-10-CM

## 2017-03-07 DIAGNOSIS — A41.01 MSSA (METHICILLIN SUSCEPTIBLE STAPHYLOCOCCUS AUREUS) SEPTICEMIA (HCC): ICD-10-CM

## 2017-03-07 PROCEDURE — 25010000002 DAPTOMYCIN PER 1 MG: Performed by: INTERNAL MEDICINE

## 2017-03-07 PROCEDURE — 96365 THER/PROPH/DIAG IV INF INIT: CPT

## 2017-03-07 RX ADMIN — DAPTOMYCIN 620 MG: 500 INJECTION, POWDER, LYOPHILIZED, FOR SOLUTION INTRAVENOUS at 10:41

## 2017-03-08 ENCOUNTER — HOSPITAL ENCOUNTER (OUTPATIENT)
Dept: INFUSION THERAPY | Facility: HOSPITAL | Age: 62
Discharge: HOME | End: 2017-03-08
Admitting: INTERNAL MEDICINE

## 2017-03-08 VITALS
OXYGEN SATURATION: 100 % | DIASTOLIC BLOOD PRESSURE: 76 MMHG | HEART RATE: 85 BPM | RESPIRATION RATE: 20 BRPM | TEMPERATURE: 98.1 F | SYSTOLIC BLOOD PRESSURE: 139 MMHG

## 2017-03-08 DIAGNOSIS — A41.01 MSSA (METHICILLIN SUSCEPTIBLE STAPHYLOCOCCUS AUREUS) SEPTICEMIA (HCC): ICD-10-CM

## 2017-03-08 DIAGNOSIS — A41.9 SEPTICEMIA (HCC): ICD-10-CM

## 2017-03-08 PROCEDURE — 25010000002 DAPTOMYCIN PER 1 MG: Performed by: INTERNAL MEDICINE

## 2017-03-08 PROCEDURE — 96365 THER/PROPH/DIAG IV INF INIT: CPT

## 2017-03-08 RX ADMIN — DAPTOMYCIN 620 MG: 500 INJECTION, POWDER, LYOPHILIZED, FOR SOLUTION INTRAVENOUS at 10:54

## 2017-03-09 ENCOUNTER — HOSPITAL ENCOUNTER (OUTPATIENT)
Dept: INFUSION THERAPY | Facility: HOSPITAL | Age: 62
Discharge: HOME | End: 2017-03-09
Admitting: INTERNAL MEDICINE

## 2017-03-09 VITALS
OXYGEN SATURATION: 100 % | DIASTOLIC BLOOD PRESSURE: 76 MMHG | TEMPERATURE: 98.1 F | SYSTOLIC BLOOD PRESSURE: 131 MMHG | HEART RATE: 94 BPM | RESPIRATION RATE: 20 BRPM

## 2017-03-09 DIAGNOSIS — A41.9 SEPTICEMIA (HCC): ICD-10-CM

## 2017-03-09 DIAGNOSIS — A41.01 MSSA (METHICILLIN SUSCEPTIBLE STAPHYLOCOCCUS AUREUS) SEPTICEMIA (HCC): ICD-10-CM

## 2017-03-09 PROCEDURE — 25010000002 DAPTOMYCIN PER 1 MG: Performed by: INTERNAL MEDICINE

## 2017-03-09 PROCEDURE — 96365 THER/PROPH/DIAG IV INF INIT: CPT

## 2017-03-09 RX ADMIN — DAPTOMYCIN 620 MG: 500 INJECTION, POWDER, LYOPHILIZED, FOR SOLUTION INTRAVENOUS at 11:48

## 2017-03-10 ENCOUNTER — HOSPITAL ENCOUNTER (OUTPATIENT)
Dept: INFUSION THERAPY | Facility: HOSPITAL | Age: 62
Discharge: HOME | End: 2017-03-10
Admitting: INTERNAL MEDICINE

## 2017-03-10 VITALS
HEART RATE: 94 BPM | RESPIRATION RATE: 16 BRPM | SYSTOLIC BLOOD PRESSURE: 127 MMHG | OXYGEN SATURATION: 98 % | DIASTOLIC BLOOD PRESSURE: 80 MMHG | TEMPERATURE: 97.1 F

## 2017-03-10 DIAGNOSIS — A41.01 MSSA (METHICILLIN SUSCEPTIBLE STAPHYLOCOCCUS AUREUS) SEPTICEMIA (HCC): ICD-10-CM

## 2017-03-10 DIAGNOSIS — A41.9 SEPTICEMIA (HCC): ICD-10-CM

## 2017-03-10 PROCEDURE — 25010000002 DAPTOMYCIN PER 1 MG: Performed by: INTERNAL MEDICINE

## 2017-03-10 PROCEDURE — 96365 THER/PROPH/DIAG IV INF INIT: CPT

## 2017-03-10 RX ADMIN — DAPTOMYCIN 620 MG: 500 INJECTION, POWDER, LYOPHILIZED, FOR SOLUTION INTRAVENOUS at 10:14

## 2017-03-11 ENCOUNTER — HOSPITAL ENCOUNTER (OUTPATIENT)
Dept: INFUSION THERAPY | Facility: HOSPITAL | Age: 62
Discharge: HOME | End: 2017-03-11
Admitting: INTERNAL MEDICINE

## 2017-03-11 VITALS
DIASTOLIC BLOOD PRESSURE: 79 MMHG | HEART RATE: 98 BPM | OXYGEN SATURATION: 100 % | RESPIRATION RATE: 20 BRPM | TEMPERATURE: 98.6 F | SYSTOLIC BLOOD PRESSURE: 121 MMHG

## 2017-03-11 DIAGNOSIS — A41.01 MSSA (METHICILLIN SUSCEPTIBLE STAPHYLOCOCCUS AUREUS) SEPTICEMIA (HCC): ICD-10-CM

## 2017-03-11 DIAGNOSIS — A41.9 SEPTICEMIA (HCC): ICD-10-CM

## 2017-03-11 PROCEDURE — 96365 THER/PROPH/DIAG IV INF INIT: CPT

## 2017-03-11 PROCEDURE — 25010000002 DAPTOMYCIN PER 1 MG: Performed by: INTERNAL MEDICINE

## 2017-03-11 RX ADMIN — DAPTOMYCIN 620 MG: 500 INJECTION, POWDER, LYOPHILIZED, FOR SOLUTION INTRAVENOUS at 10:33

## 2017-03-12 ENCOUNTER — HOSPITAL ENCOUNTER (OUTPATIENT)
Dept: INFUSION THERAPY | Facility: HOSPITAL | Age: 62
Discharge: HOME | End: 2017-03-12
Admitting: INTERNAL MEDICINE

## 2017-03-12 VITALS
DIASTOLIC BLOOD PRESSURE: 81 MMHG | RESPIRATION RATE: 16 BRPM | TEMPERATURE: 98.7 F | SYSTOLIC BLOOD PRESSURE: 147 MMHG | OXYGEN SATURATION: 99 % | HEART RATE: 120 BPM

## 2017-03-12 DIAGNOSIS — A41.9 SEPTICEMIA (HCC): ICD-10-CM

## 2017-03-12 DIAGNOSIS — A41.01 MSSA (METHICILLIN SUSCEPTIBLE STAPHYLOCOCCUS AUREUS) SEPTICEMIA (HCC): ICD-10-CM

## 2017-03-12 PROCEDURE — 96365 THER/PROPH/DIAG IV INF INIT: CPT

## 2017-03-12 PROCEDURE — 25010000002 DAPTOMYCIN PER 1 MG: Performed by: INTERNAL MEDICINE

## 2017-03-12 RX ADMIN — DAPTOMYCIN 620 MG: 500 INJECTION, POWDER, LYOPHILIZED, FOR SOLUTION INTRAVENOUS at 10:25

## 2017-03-12 NOTE — PROGRESS NOTES
Patient given IV daptomycin without incident. To return to ACU 3/13/17. Ambulated to car per self at 1110.

## 2017-03-13 ENCOUNTER — HOSPITAL ENCOUNTER (OUTPATIENT)
Dept: INFUSION THERAPY | Facility: HOSPITAL | Age: 62
Discharge: HOME | End: 2017-03-13
Admitting: INTERNAL MEDICINE

## 2017-03-13 VITALS
DIASTOLIC BLOOD PRESSURE: 70 MMHG | OXYGEN SATURATION: 100 % | TEMPERATURE: 99 F | RESPIRATION RATE: 28 BRPM | HEART RATE: 98 BPM | SYSTOLIC BLOOD PRESSURE: 122 MMHG

## 2017-03-13 DIAGNOSIS — A41.01 MSSA (METHICILLIN SUSCEPTIBLE STAPHYLOCOCCUS AUREUS) SEPTICEMIA (HCC): ICD-10-CM

## 2017-03-13 DIAGNOSIS — A41.9 SEPTICEMIA (HCC): ICD-10-CM

## 2017-03-13 LAB
ALBUMIN SERPL-MCNC: 3.9 G/DL (ref 3.5–5.2)
ALBUMIN/GLOB SERPL: 1 G/DL
ALP SERPL-CCNC: 62 U/L (ref 39–117)
ALT SERPL W P-5'-P-CCNC: 34 U/L (ref 1–41)
ANION GAP SERPL CALCULATED.3IONS-SCNC: 11.6 MMOL/L
AST SERPL-CCNC: 27 U/L (ref 1–40)
BASOPHILS # BLD AUTO: 0.04 10*3/MM3 (ref 0–0.2)
BASOPHILS NFR BLD AUTO: 0.5 % (ref 0–1.5)
BILIRUB SERPL-MCNC: 0.3 MG/DL (ref 0.1–1.2)
BUN BLD-MCNC: 15 MG/DL (ref 8–23)
BUN/CREAT SERPL: 14.9 (ref 7–25)
CALCIUM SPEC-SCNC: 10.2 MG/DL (ref 8.6–10.5)
CHLORIDE SERPL-SCNC: 100 MMOL/L (ref 98–107)
CK SERPL-CCNC: 145 U/L (ref 20–200)
CO2 SERPL-SCNC: 27.4 MMOL/L (ref 22–29)
CREAT BLD-MCNC: 1.01 MG/DL (ref 0.76–1.27)
DEPRECATED RDW RBC AUTO: 55 FL (ref 37–54)
EOSINOPHIL # BLD AUTO: 0.14 10*3/MM3 (ref 0–0.7)
EOSINOPHIL NFR BLD AUTO: 1.7 % (ref 0.3–6.2)
ERYTHROCYTE [DISTWIDTH] IN BLOOD BY AUTOMATED COUNT: 15.9 % (ref 11.5–14.5)
GFR SERPL CREATININE-BSD FRML MDRD: 75 ML/MIN/1.73
GLOBULIN UR ELPH-MCNC: 4 GM/DL
GLUCOSE BLD-MCNC: 87 MG/DL (ref 65–99)
HCT VFR BLD AUTO: 39 % (ref 40.4–52.2)
HGB BLD-MCNC: 12.9 G/DL (ref 13.7–17.6)
IMM GRANULOCYTES # BLD: 0.04 10*3/MM3 (ref 0–0.03)
IMM GRANULOCYTES NFR BLD: 0.5 % (ref 0–0.5)
LYMPHOCYTES # BLD AUTO: 1.12 10*3/MM3 (ref 0.9–4.8)
LYMPHOCYTES NFR BLD AUTO: 14 % (ref 19.6–45.3)
MCH RBC QN AUTO: 31.7 PG (ref 27–32.7)
MCHC RBC AUTO-ENTMCNC: 33.1 G/DL (ref 32.6–36.4)
MCV RBC AUTO: 95.8 FL (ref 79.8–96.2)
MONOCYTES # BLD AUTO: 0.55 10*3/MM3 (ref 0.2–1.2)
MONOCYTES NFR BLD AUTO: 6.9 % (ref 5–12)
NEUTROPHILS # BLD AUTO: 6.13 10*3/MM3 (ref 1.9–8.1)
NEUTROPHILS NFR BLD AUTO: 76.4 % (ref 42.7–76)
PLATELET # BLD AUTO: 275 10*3/MM3 (ref 140–500)
PMV BLD AUTO: 10.1 FL (ref 6–12)
POTASSIUM BLD-SCNC: 4.4 MMOL/L (ref 3.5–5.2)
PROT SERPL-MCNC: 7.9 G/DL (ref 6–8.5)
RBC # BLD AUTO: 4.07 10*6/MM3 (ref 4.6–6)
SODIUM BLD-SCNC: 139 MMOL/L (ref 136–145)
WBC NRBC COR # BLD: 8.02 10*3/MM3 (ref 4.5–10.7)

## 2017-03-13 PROCEDURE — 96365 THER/PROPH/DIAG IV INF INIT: CPT

## 2017-03-13 PROCEDURE — 85025 COMPLETE CBC W/AUTO DIFF WBC: CPT | Performed by: INTERNAL MEDICINE

## 2017-03-13 PROCEDURE — 25010000002 DAPTOMYCIN PER 1 MG: Performed by: INTERNAL MEDICINE

## 2017-03-13 PROCEDURE — 80053 COMPREHEN METABOLIC PANEL: CPT | Performed by: INTERNAL MEDICINE

## 2017-03-13 PROCEDURE — 82550 ASSAY OF CK (CPK): CPT | Performed by: INTERNAL MEDICINE

## 2017-03-13 RX ADMIN — DAPTOMYCIN 620 MG: 500 INJECTION, POWDER, LYOPHILIZED, FOR SOLUTION INTRAVENOUS at 11:47

## 2017-03-14 ENCOUNTER — HOSPITAL ENCOUNTER (OUTPATIENT)
Dept: INFUSION THERAPY | Facility: HOSPITAL | Age: 62
Discharge: HOME | End: 2017-03-14
Admitting: INTERNAL MEDICINE

## 2017-03-14 VITALS
HEART RATE: 97 BPM | DIASTOLIC BLOOD PRESSURE: 75 MMHG | TEMPERATURE: 99 F | OXYGEN SATURATION: 100 % | SYSTOLIC BLOOD PRESSURE: 123 MMHG | RESPIRATION RATE: 20 BRPM

## 2017-03-14 DIAGNOSIS — A41.9 SEPTICEMIA (HCC): ICD-10-CM

## 2017-03-14 DIAGNOSIS — A41.01 MSSA (METHICILLIN SUSCEPTIBLE STAPHYLOCOCCUS AUREUS) SEPTICEMIA (HCC): ICD-10-CM

## 2017-03-14 PROCEDURE — 25010000002 DAPTOMYCIN PER 1 MG: Performed by: INTERNAL MEDICINE

## 2017-03-14 PROCEDURE — 96365 THER/PROPH/DIAG IV INF INIT: CPT

## 2017-03-14 RX ADMIN — DAPTOMYCIN 620 MG: 500 INJECTION, POWDER, LYOPHILIZED, FOR SOLUTION INTRAVENOUS at 10:49

## 2017-03-15 ENCOUNTER — OFFICE VISIT (OUTPATIENT)
Dept: INFECTIOUS DISEASES | Facility: CLINIC | Age: 62
End: 2017-03-15

## 2017-03-15 ENCOUNTER — HOSPITAL ENCOUNTER (OUTPATIENT)
Dept: INFUSION THERAPY | Facility: HOSPITAL | Age: 62
Discharge: HOME | End: 2017-03-15
Admitting: INTERNAL MEDICINE

## 2017-03-15 VITALS
RESPIRATION RATE: 20 BRPM | OXYGEN SATURATION: 99 % | TEMPERATURE: 98 F | HEART RATE: 96 BPM | SYSTOLIC BLOOD PRESSURE: 129 MMHG | DIASTOLIC BLOOD PRESSURE: 76 MMHG

## 2017-03-15 VITALS
DIASTOLIC BLOOD PRESSURE: 82 MMHG | WEIGHT: 180.2 LBS | SYSTOLIC BLOOD PRESSURE: 126 MMHG | TEMPERATURE: 97.8 F | HEIGHT: 73 IN | BODY MASS INDEX: 23.88 KG/M2 | HEART RATE: 98 BPM

## 2017-03-15 DIAGNOSIS — L03.113 CELLULITIS OF RIGHT UPPER EXTREMITY: ICD-10-CM

## 2017-03-15 DIAGNOSIS — A41.9 SEPTICEMIA (HCC): ICD-10-CM

## 2017-03-15 DIAGNOSIS — A41.01 MSSA (METHICILLIN SUSCEPTIBLE STAPHYLOCOCCUS AUREUS) SEPTICEMIA (HCC): Primary | ICD-10-CM

## 2017-03-15 DIAGNOSIS — A41.01 MSSA (METHICILLIN SUSCEPTIBLE STAPHYLOCOCCUS AUREUS) SEPTICEMIA (HCC): ICD-10-CM

## 2017-03-15 DIAGNOSIS — F19.10 IV DRUG ABUSE (HCC): ICD-10-CM

## 2017-03-15 DIAGNOSIS — Z79.2 LONG TERM (CURRENT) USE OF ANTIBIOTICS: ICD-10-CM

## 2017-03-15 DIAGNOSIS — G89.29 CHRONIC MIDLINE LOW BACK PAIN WITHOUT SCIATICA: ICD-10-CM

## 2017-03-15 DIAGNOSIS — M54.50 CHRONIC MIDLINE LOW BACK PAIN WITHOUT SCIATICA: ICD-10-CM

## 2017-03-15 PROCEDURE — 96365 THER/PROPH/DIAG IV INF INIT: CPT

## 2017-03-15 PROCEDURE — 25010000002 DAPTOMYCIN PER 1 MG: Performed by: INTERNAL MEDICINE

## 2017-03-15 PROCEDURE — 99214 OFFICE O/P EST MOD 30 MIN: CPT | Performed by: INTERNAL MEDICINE

## 2017-03-15 RX ADMIN — DAPTOMYCIN 620 MG: 500 INJECTION, POWDER, LYOPHILIZED, FOR SOLUTION INTRAVENOUS at 11:48

## 2017-03-15 NOTE — PROGRESS NOTES
Pt tolerated well.Last dose of antibiotic today completed per md orders. Pt to F/U with MD in one week per his appointment today. D/C per ambulation at 1240.
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
## 2017-03-28 ENCOUNTER — TELEPHONE (OUTPATIENT)
Dept: INFECTIOUS DISEASES | Facility: CLINIC | Age: 62
End: 2017-03-28

## 2017-03-28 NOTE — TELEPHONE ENCOUNTER
Patient was supposed to repeat blood cultures on 03/22..Do you want me to contact patient to see if he's planning on having them done?

## 2017-04-10 ENCOUNTER — TELEPHONE (OUTPATIENT)
Dept: INFECTIOUS DISEASES | Facility: CLINIC | Age: 62
End: 2017-04-10

## 2017-04-10 NOTE — TELEPHONE ENCOUNTER
Patient states that he used IV drugs again and thinks he may have an infection like before.  Patient is requesting to be seen.      **Dr. Faustin had request that the patient have repeat blood cultures on 03/22 after his 03/15 appointment.  I had called the patient twice to follow up with him to see if he would come and have those labs drawn, but the patient never returned the call or had the cultures.  Therefore the order is still in Epic.

## 2017-04-10 NOTE — TELEPHONE ENCOUNTER
We can certainly have his blood cx drawn as was originally requested by Dr. Faustin. What kind of symptoms is he having that he is concerned about infection?   If he is having fever's or any other concerning symptoms he needs to go to the ER be evaluated

## 2017-04-10 NOTE — TELEPHONE ENCOUNTER
Patient states that he is having cold sweats, lower back pain and fatigue.  I informed him per Dr. Pineda that if he wanted to have cultures performed that he could.  However, if he began to have a fever or any other concerning symptoms, that he should go to the ED.

## 2017-04-11 ENCOUNTER — TELEPHONE (OUTPATIENT)
Dept: INFECTIOUS DISEASES | Facility: CLINIC | Age: 62
End: 2017-04-11

## 2017-04-11 NOTE — TELEPHONE ENCOUNTER
"Patient called and stated that he called yesterday and spoke with someone at our office.  I informed the patient that I am the one that spoke with him yesterday.  Patient proceeded to state, \"I am not happy with what you told me yesterday\".  I asked him, regarding what?  He stated that he had called to get an appointment with Dr. Faustin yesterday(patient wanted to come in right then) and that I had told him that he should go to the ER.  Patient began to rant about how all that doctors do, is tell people to go to the ER and how he has a staph infection and that he should be able to come in that same day when having such an infection.  I tried to calmly explain to the patient that Dr. Faustin is out of town at this time and that Dr. Pineda was on call yesterday.  I told him that I had informed Dr. Pineda of his request to be seen and reported his symptoms to her.  Her advice, which I passed on to the patient yesterday was; that he should be seen in the ED if he began to experience a fever or feels worse, as there were no appointments available for that day.  The patient then stated again that he should be able to be seen whenever he calls into the office and that he will be filling a complaint against our office.  At this point, the patient was becoming louder, so I put the phone on speaker setting so that my coworker Stephani and Machelle Sandra (manager from [facility]) could be witnesses to the conversation.  Patient then stated that he was going to go see his PCP on 04/12/17, because I'm not happy about this and then he stated, \"you haven't heard the last of me\".  The patient then hung up.  "

## 2017-06-05 ENCOUNTER — HOSPITAL ENCOUNTER (EMERGENCY)
Facility: HOSPITAL | Age: 62
Discharge: HOME | End: 2017-06-05
Attending: EMERGENCY MEDICINE | Admitting: EMERGENCY MEDICINE

## 2017-06-05 ENCOUNTER — APPOINTMENT (OUTPATIENT)
Dept: CARDIOLOGY | Facility: HOSPITAL | Age: 62
End: 2017-06-05
Attending: EMERGENCY MEDICINE

## 2017-06-05 ENCOUNTER — APPOINTMENT (OUTPATIENT)
Dept: GENERAL RADIOLOGY | Facility: HOSPITAL | Age: 62
End: 2017-06-05

## 2017-06-05 VITALS
HEART RATE: 82 BPM | RESPIRATION RATE: 15 BRPM | DIASTOLIC BLOOD PRESSURE: 79 MMHG | SYSTOLIC BLOOD PRESSURE: 117 MMHG | BODY MASS INDEX: 22.53 KG/M2 | OXYGEN SATURATION: 97 % | WEIGHT: 170 LBS | HEIGHT: 73 IN | TEMPERATURE: 97.1 F

## 2017-06-05 DIAGNOSIS — L03.114 CELLULITIS OF LEFT UPPER EXTREMITY: Primary | ICD-10-CM

## 2017-06-05 DIAGNOSIS — F14.10 COCAINE ABUSE (HCC): ICD-10-CM

## 2017-06-05 DIAGNOSIS — L03.113 CELLULITIS OF RIGHT UPPER EXTREMITY: ICD-10-CM

## 2017-06-05 LAB
ALBUMIN SERPL-MCNC: 4.1 G/DL (ref 3.5–5.2)
ALBUMIN/GLOB SERPL: 1.2 G/DL
ALP SERPL-CCNC: 66 U/L (ref 39–117)
ALT SERPL W P-5'-P-CCNC: 17 U/L (ref 1–41)
ANION GAP SERPL CALCULATED.3IONS-SCNC: 11.4 MMOL/L
AST SERPL-CCNC: 21 U/L (ref 1–40)
BASOPHILS # BLD AUTO: 0.02 10*3/MM3 (ref 0–0.2)
BASOPHILS NFR BLD AUTO: 0.3 % (ref 0–1.5)
BH CV UPPER VENOUS LEFT AXILLARY AUGMENT: NORMAL
BH CV UPPER VENOUS LEFT AXILLARY COMPETENT: NORMAL
BH CV UPPER VENOUS LEFT AXILLARY COMPRESS: NORMAL
BH CV UPPER VENOUS LEFT AXILLARY PHASIC: NORMAL
BH CV UPPER VENOUS LEFT AXILLARY SPONT: NORMAL
BH CV UPPER VENOUS LEFT BASILIC FOREARM COMPRESS: NORMAL
BH CV UPPER VENOUS LEFT BASILIC UPPER COMPRESS: NORMAL
BH CV UPPER VENOUS LEFT BRACHIAL COMPRESS: NORMAL
BH CV UPPER VENOUS LEFT CEPHALIC FOREARM COMPRESS: NORMAL
BH CV UPPER VENOUS LEFT CEPHALIC UPPER COMPRESS: NORMAL
BH CV UPPER VENOUS LEFT INTERNAL JUGULAR AUGMENT: NORMAL
BH CV UPPER VENOUS LEFT INTERNAL JUGULAR COMPETENT: NORMAL
BH CV UPPER VENOUS LEFT INTERNAL JUGULAR COMPRESS: NORMAL
BH CV UPPER VENOUS LEFT INTERNAL JUGULAR PHASIC: NORMAL
BH CV UPPER VENOUS LEFT INTERNAL JUGULAR SPONT: NORMAL
BH CV UPPER VENOUS LEFT RADIAL COMPRESS: NORMAL
BH CV UPPER VENOUS LEFT SUBCLAVIAN AUGMENT: NORMAL
BH CV UPPER VENOUS LEFT SUBCLAVIAN COMPETENT: NORMAL
BH CV UPPER VENOUS LEFT SUBCLAVIAN COMPRESS: NORMAL
BH CV UPPER VENOUS LEFT SUBCLAVIAN PHASIC: NORMAL
BH CV UPPER VENOUS LEFT SUBCLAVIAN SPONT: NORMAL
BH CV UPPER VENOUS LEFT ULNAR COMPRESS: NORMAL
BH CV UPPER VENOUS RIGHT INTERNAL JUGULAR AUGMENT: NORMAL
BH CV UPPER VENOUS RIGHT INTERNAL JUGULAR COMPETENT: NORMAL
BH CV UPPER VENOUS RIGHT INTERNAL JUGULAR COMPRESS: NORMAL
BH CV UPPER VENOUS RIGHT INTERNAL JUGULAR PHASIC: NORMAL
BH CV UPPER VENOUS RIGHT INTERNAL JUGULAR SPONT: NORMAL
BH CV UPPER VENOUS RIGHT SUBCLAVIAN AUGMENT: NORMAL
BH CV UPPER VENOUS RIGHT SUBCLAVIAN COMPETENT: NORMAL
BH CV UPPER VENOUS RIGHT SUBCLAVIAN COMPRESS: NORMAL
BH CV UPPER VENOUS RIGHT SUBCLAVIAN PHASIC: NORMAL
BH CV UPPER VENOUS RIGHT SUBCLAVIAN SPONT: NORMAL
BILIRUB SERPL-MCNC: 0.3 MG/DL (ref 0.1–1.2)
BUN BLD-MCNC: 18 MG/DL (ref 8–23)
BUN/CREAT SERPL: 17.1 (ref 7–25)
CALCIUM SPEC-SCNC: 9.8 MG/DL (ref 8.6–10.5)
CHLORIDE SERPL-SCNC: 98 MMOL/L (ref 98–107)
CO2 SERPL-SCNC: 26.6 MMOL/L (ref 22–29)
CREAT BLD-MCNC: 1.05 MG/DL (ref 0.76–1.27)
DEPRECATED RDW RBC AUTO: 51.7 FL (ref 37–54)
EOSINOPHIL # BLD AUTO: 0.04 10*3/MM3 (ref 0–0.7)
EOSINOPHIL NFR BLD AUTO: 0.6 % (ref 0.3–6.2)
ERYTHROCYTE [DISTWIDTH] IN BLOOD BY AUTOMATED COUNT: 15.1 % (ref 11.5–14.5)
GFR SERPL CREATININE-BSD FRML MDRD: 72 ML/MIN/1.73
GLOBULIN UR ELPH-MCNC: 3.5 GM/DL
GLUCOSE BLD-MCNC: 101 MG/DL (ref 65–99)
HCT VFR BLD AUTO: 44.6 % (ref 40.4–52.2)
HGB BLD-MCNC: 14.2 G/DL (ref 13.7–17.6)
HOLD SPECIMEN: NORMAL
IMM GRANULOCYTES # BLD: 0 10*3/MM3 (ref 0–0.03)
IMM GRANULOCYTES NFR BLD: 0 % (ref 0–0.5)
LYMPHOCYTES # BLD AUTO: 0.84 10*3/MM3 (ref 0.9–4.8)
LYMPHOCYTES NFR BLD AUTO: 13.1 % (ref 19.6–45.3)
MCH RBC QN AUTO: 30.3 PG (ref 27–32.7)
MCHC RBC AUTO-ENTMCNC: 31.8 G/DL (ref 32.6–36.4)
MCV RBC AUTO: 95.1 FL (ref 79.8–96.2)
MONOCYTES # BLD AUTO: 0.82 10*3/MM3 (ref 0.2–1.2)
MONOCYTES NFR BLD AUTO: 12.8 % (ref 5–12)
NEUTROPHILS # BLD AUTO: 4.67 10*3/MM3 (ref 1.9–8.1)
NEUTROPHILS NFR BLD AUTO: 73.2 % (ref 42.7–76)
PLATELET # BLD AUTO: 179 10*3/MM3 (ref 140–500)
PMV BLD AUTO: 10.7 FL (ref 6–12)
POTASSIUM BLD-SCNC: 4.1 MMOL/L (ref 3.5–5.2)
PROT SERPL-MCNC: 7.6 G/DL (ref 6–8.5)
RBC # BLD AUTO: 4.69 10*6/MM3 (ref 4.6–6)
SODIUM BLD-SCNC: 136 MMOL/L (ref 136–145)
TROPONIN T SERPL-MCNC: <0.01 NG/ML (ref 0–0.03)
WBC NRBC COR # BLD: 6.39 10*3/MM3 (ref 4.5–10.7)
WHOLE BLOOD HOLD SPECIMEN: NORMAL

## 2017-06-05 PROCEDURE — 71020 HC CHEST PA AND LATERAL: CPT

## 2017-06-05 PROCEDURE — 80053 COMPREHEN METABOLIC PANEL: CPT | Performed by: EMERGENCY MEDICINE

## 2017-06-05 PROCEDURE — 93010 ELECTROCARDIOGRAM REPORT: CPT | Performed by: INTERNAL MEDICINE

## 2017-06-05 PROCEDURE — 93971 EXTREMITY STUDY: CPT

## 2017-06-05 PROCEDURE — 85025 COMPLETE CBC W/AUTO DIFF WBC: CPT | Performed by: EMERGENCY MEDICINE

## 2017-06-05 PROCEDURE — 84484 ASSAY OF TROPONIN QUANT: CPT | Performed by: EMERGENCY MEDICINE

## 2017-06-05 PROCEDURE — 93005 ELECTROCARDIOGRAM TRACING: CPT

## 2017-06-05 PROCEDURE — 99283 EMERGENCY DEPT VISIT LOW MDM: CPT

## 2017-06-05 PROCEDURE — 36415 COLL VENOUS BLD VENIPUNCTURE: CPT | Performed by: EMERGENCY MEDICINE

## 2017-06-05 RX ORDER — TESTOSTERONE 30 MG/1.5ML
SOLUTION TOPICAL
COMMUNITY
Start: 2016-08-20

## 2017-06-05 RX ORDER — ERGOCALCIFEROL 1.25 MG/1
CAPSULE ORAL
COMMUNITY
Start: 2015-06-15

## 2017-06-05 RX ORDER — ASPIRIN 325 MG
325 TABLET ORAL ONCE
Status: DISCONTINUED | OUTPATIENT
Start: 2017-06-05 | End: 2017-06-05 | Stop reason: HOSPADM

## 2017-06-05 RX ORDER — DOXYCYCLINE 100 MG/1
100 CAPSULE ORAL 2 TIMES DAILY
Qty: 20 CAPSULE | Refills: 0 | Status: SHIPPED | OUTPATIENT
Start: 2017-06-05

## 2017-06-05 RX ORDER — RISPERIDONE 1 MG/1
TABLET ORAL
COMMUNITY
Start: 2015-01-19

## 2017-06-05 RX ORDER — SODIUM CHLORIDE 0.9 % (FLUSH) 0.9 %
10 SYRINGE (ML) INJECTION AS NEEDED
Status: DISCONTINUED | OUTPATIENT
Start: 2017-06-05 | End: 2017-06-05 | Stop reason: HOSPADM

## 2017-06-05 RX ORDER — ROSUVASTATIN CALCIUM 10 MG/1
10 TABLET, COATED ORAL
COMMUNITY
Start: 2016-03-01

## 2017-06-05 NOTE — PROGRESS NOTES
Vascular Lab    LUE Venous doppler completed    Preliminary exam is Negative for DVT    Results to Dr. Logan Forbes T

## 2017-06-05 NOTE — ED PROVIDER NOTES
" EMERGENCY DEPARTMENT ENCOUNTER    CHIEF COMPLAINT  Chief Complaint: Abscesses to the bilateral arms  History given by: Patient  History limited by: Nothing  Room Number: 16/16  PMD: Kishor Camacho MD      HPI:  Pt is a 62 y.o. male who presents complaining of abscess to the bilateral arms onset several days ago. The pt states the abscesses are from IV drug use of cocaine. The pt reports a hx of abscesses and he states he saw his PCP 1 month ago and he was given antibiotics for treatment of abscesses. The pt states the antibiotic resolved his abscesses. Pt reports CP onset 0900, blue feet onset this morning after getting out of the shower, mild dry cough, SOA, and weight change of 10-15 lbs. The pt describes the CP as dull pain in the left chest. The pt states the episode lasted several minutes, but has since resolved. Pt denies fever, HA, abd pain, vomiting, diarrhea, and dysuria. The pt states he has been using cocaine intermittently over the past 3 years. The pt states he has tried to get treatment for the addiction, but he has always relapsed. The pt last used cocaine this morning. The pt states he had a stress test one year ago that he passed.      Duration:  Several days ago  Onset: Gradual  Timing: Constant  Location: Bilateral arms  Radiation: None  Quality: \"Pain\"  Intensity/Severity: Moderate  Progression: Unchanged  Associated Symptoms: CP onset 0900, blue feet onset this morning after getting out of the shower, mild dry cough, SOA, and weight change of 10-15 lbs  Aggravating Factors: Nothing  Alleviating Factors: Nothing  Previous Episodes: The pt reports a hx of abscesses and he states he saw his PCP 1 month ago and he was given antibiotics for treatment of abscesses. The pt states the antibiotic resolved his abscesses.  Treatment before arrival: Cocaine earlier this morning    PAST MEDICAL HISTORY  Active Ambulatory Problems     Diagnosis Date Noted   • Sepsis 02/13/2017   • Low back pain " 02/14/2017   • Drug abuse, IVDA cocaine 02/14/2017   • Cellulitis of upper extremity 02/14/2017   • MSSA (methicillin susceptible Staphylococcus aureus) septicemia 02/16/2017   • Septicemia 02/17/2017   • Drug-induced constipation 02/19/2017     Resolved Ambulatory Problems     Diagnosis Date Noted   • No Resolved Ambulatory Problems     Past Medical History:   Diagnosis Date   • Alcoholism    • Kidney failure    • Substance abuse        PAST SURGICAL HISTORY  Past Surgical History:   Procedure Laterality Date   • APPENDECTOMY         FAMILY HISTORY  Family History   Problem Relation Age of Onset   • Alcohol abuse Father        SOCIAL HISTORY  Social History     Social History   • Marital status: Single     Spouse name: N/A   • Number of children: N/A   • Years of education: N/A     Occupational History   • property management      Social History Main Topics   • Smoking status: Never Smoker   • Smokeless tobacco: Not on file   • Alcohol use No   • Drug use: 7.00 per week     Special: Cocaine   • Sexual activity: Not on file     Other Topics Concern   • Not on file     Social History Narrative   • No narrative on file       ALLERGIES  Review of patient's allergies indicates no known allergies.    REVIEW OF SYSTEMS  Review of Systems   Constitutional: Positive for unexpected weight change (10-15 lbs).   HENT: Negative.    Respiratory: Positive for cough (Mild dry) and shortness of breath.    Cardiovascular: Positive for chest pain (Onset 0900 this morning).   Gastrointestinal: Negative.    Genitourinary: Negative.    Musculoskeletal: Negative.    Skin: Positive for color change (Blue feet onset this morning after getting out of the shower).   All other systems reviewed and are negative.      PHYSICAL EXAM  ED Triage Vitals   Temp Heart Rate Resp BP SpO2   06/05/17 1347 06/05/17 1347 06/05/17 1347 06/05/17 1347 06/05/17 1347   97.1 °F (36.2 °C) 103 18 108/69 96 %      Temp src Heart Rate Source Patient Position BP  Location FiO2 (%)   06/05/17 1347 -- -- -- --   Tympanic           Physical Exam   Constitutional: He is oriented to person, place, and time and well-developed, well-nourished, and in no distress.   HENT:   Head: Normocephalic and atraumatic.   Eyes: EOM are normal. Pupils are equal, round, and reactive to light.   Neck: Normal range of motion. Neck supple.   Cardiovascular: Normal rate, regular rhythm and intact distal pulses.    Murmur heard.  Pulmonary/Chest: Effort normal and breath sounds normal. No respiratory distress.   Abdominal: Soft. There is no tenderness. There is no rebound and no guarding.   Musculoskeletal: Normal range of motion. He exhibits no edema.   The pt's feet have normal color.   Neurological: He is alert and oriented to person, place, and time. He has normal sensation and normal strength.   Skin: Skin is warm and dry.   The pt has induration and erythema to the right antecubital fossa. The pt has a venous cord to the left arm that is consistent with phlebitis.  The pt has multiple puncture marks to the arms bilaterally.     Psychiatric: Mood and affect normal.   Nursing note and vitals reviewed.      LAB RESULTS  Lab Results (last 24 hours)     Procedure Component Value Units Date/Time    CBC & Differential [[id number]] Collected:  06/05/17 1452    Specimen:  Blood Updated:  06/05/17 8954    Narrative:       The following orders were created for panel order CBC & Differential.  Procedure                               Abnormality         Status                     ---------                               -----------         ------                     CBC Auto Differential[[id number]]        Abnormal            Final result                 Please view results for these tests on the individual orders.    Comprehensive Metabolic Panel [[id number]]  (Abnormal) Collected:  06/05/17 1452    Specimen:  Blood Updated:  06/05/17 1531     Glucose 101 (H) mg/dL      BUN 18 mg/dL      Creatinine 1.05 mg/dL       Sodium 136 mmol/L      Potassium 4.1 mmol/L      Chloride 98 mmol/L      CO2 26.6 mmol/L      Calcium 9.8 mg/dL      Total Protein 7.6 g/dL      Albumin 4.10 g/dL      ALT (SGPT) 17 U/L      AST (SGOT) 21 U/L      Alkaline Phosphatase 66 U/L      Total Bilirubin 0.3 mg/dL      eGFR Non African Amer 72 mL/min/1.73      Globulin 3.5 gm/dL      A/G Ratio 1.2 g/dL      BUN/Creatinine Ratio 17.1     Anion Gap 11.4 mmol/L     Troponin [[id number]]  (Normal) Collected:  06/05/17 1452    Specimen:  Blood Updated:  06/05/17 1531     Troponin T <0.010 ng/mL     Narrative:       Troponin T Reference Ranges:  Less than 0.03 ng/mL:    Negative for AMI  0.03 to 0.09 ng/mL:      Indeterminant for AMI  Greater than 0.09 ng/mL: Positive for AMI    CBC Auto Differential [[id number]]  (Abnormal) Collected:  06/05/17 1452    Specimen:  Blood Updated:  06/05/17 1504     WBC 6.39 10*3/mm3      RBC 4.69 10*6/mm3      Hemoglobin 14.2 g/dL      Hematocrit 44.6 %      MCV 95.1 fL      MCH 30.3 pg      MCHC 31.8 (L) g/dL      RDW 15.1 (H) %      RDW-SD 51.7 fl      MPV 10.7 fL      Platelets 179 10*3/mm3      Neutrophil % 73.2 %      Lymphocyte % 13.1 (L) %      Monocyte % 12.8 (H) %      Eosinophil % 0.6 %      Basophil % 0.3 %      Immature Grans % 0.0 %      Neutrophils, Absolute 4.67 10*3/mm3      Lymphocytes, Absolute 0.84 (L) 10*3/mm3      Monocytes, Absolute 0.82 10*3/mm3      Eosinophils, Absolute 0.04 10*3/mm3      Basophils, Absolute 0.02 10*3/mm3      Immature Grans, Absolute 0.00 10*3/mm3         The pt's Duplex Venous LUE was negative for DVT.    I ordered the above labs and reviewed the results    RADIOLOGY  XR Chest 2 View   Final Result   No evidence for acute pulmonary process. Follow-up as   clinical indications persist.       This report was finalized on 6/5/2017 2:47 PM by Dr. Jose Manuel Billingsley MD.               I ordered the above noted radiological studies. Interpreted by radiologist. Reviewed by me in PACS.        PROCEDURES  Procedures    EKG           EKG time: 1417  Rhythm/Rate: 83 Normal Sinus  P waves and HI: Normal  QRS, axis: Inferior Q waves   ST and T waves: Nonspecific ST and T wave changes     Interpreted Contemporaneously by me, independently viewed  Q waves are more prominent compared to prior 9 - 2016      PROGRESS AND CONSULTS  ED Course     1411  CXR, EKG, and labs ordered for further evaluation.    1645  Consult placed to Access. Duplex Venous LUE ordered for further evaluation.    1907  BP- 128/79 HR- 78 Temp- 97.1 °F (36.2 °C) (Tympanic) O2 sat- 100%    Rechecked pt, he is resting comfortably. Discussed the lab and radiology results with the pt including the Duplex Venous that was negative for DVT. Discussed the plan to discharge the pt with antibiotics for treatment of the abscess. I advised the pt to apply warm compresses to the abscesses to help with healing. The pt understands and agrees with the plan.    MEDICAL DECISION MAKING  Results were reviewed/discussed with the patient and they were also made aware of online access. Pt also made aware that some labs, such as cultures, will not be resulted during ER visit and follow up with PMD is necessary.     MDM  Number of Diagnoses or Management Options  Cellulitis of left upper extremity:   Cellulitis of right upper extremity:   Cocaine abuse:      Amount and/or Complexity of Data Reviewed  Clinical lab tests: ordered and reviewed (The pt's Duplex Venous LUE was negative for DVT.)  Tests in the radiology section of CPT®: ordered and reviewed (CXR - No evidence for acute pulmonary process. Follow-up as clinical indications persist.  )  Tests in the medicine section of CPT®: ordered and reviewed (Refer to the procedure section of the note for EKG results  )    Patient Progress  Patient progress: stable         DIAGNOSIS  Final diagnoses:   Cellulitis of left upper extremity   Cellulitis of right upper extremity   Cocaine abuse        DISPOSITION  DISCHARGE    Patient discharged in stable condition.    Reviewed implications of results, diagnosis, meds, responsibility to follow up, warning signs and symptoms of possible worsening, potential complications and reasons to return to ER.    Patient/Family voiced understanding of above instructions.    Discussed plan for discharge, as there is no emergent indication for admission.  Pt/family is agreeable and understands need for follow up and repeat testing.  Pt is aware that discharge does not mean that nothing is wrong but it indicates no emergency is present that requires admission and they must continue care with follow-up as given below or physician of their choice.     FOLLOW-UP  Kishor Camacho MD  [address] [facility] [id number] [phone]          [facility]  [address] [facility] [address] [phone]    Call for Appointment         Medication List      New Prescriptions          doxycycline 100 MG capsule   Commonly known as:  MONODOX   Take 1 capsule by mouth 2 (Two) Times a Day.         Stop          AXIRON 30 MG/ACT solution   Generic drug:  Testosterone       clomiPHENE 50 MG tablet   Commonly known as:  CLOMID       DAPTOmycin 620 mg in sodium chloride 0.9 % 50 mL       docusate sodium 250 MG capsule   Commonly known as:  COLACE       melatonin 5 MG tablet tablet       metaxalone 800 MG tablet   Commonly known as:  SKELAXIN       MULTIVITAMIN ADULT PO       naproxen 500 MG tablet   Commonly known as:  NAPROSYN       polyethylene glycol packet   Commonly known as:  MIRALAX       risperiDONE 1 MG tablet   Commonly known as:  risperDAL       rosuvastatin 10 MG tablet   Commonly known as:  CRESTOR       sertraline 50 MG tablet   Commonly known as:  ZOLOFT       vitamin D 54520 UNITS capsule capsule   Commonly known as:  ERGOCALCIFEROL               Latest Documented Vital Signs:  As of 7:12 PM  BP- 128/79 HR- 78 Temp- 97.1 °F  (36.2 °C) (Tympanic) O2 sat- 100%    --  Documentation assistance provided by hailey Bush for Dr. Ford.  Information recorded by the scribe was done at my direction and has been verified and validated by me.       Sj Bush  06/05/17 1912       Arnold Ford MD  06/05/17 1913

## 2017-06-07 ENCOUNTER — HOSPITAL ENCOUNTER (EMERGENCY)
Facility: HOSPITAL | Age: 62
Discharge: HOME | End: 2017-06-08
Attending: EMERGENCY MEDICINE | Admitting: EMERGENCY MEDICINE

## 2017-06-07 ENCOUNTER — APPOINTMENT (OUTPATIENT)
Dept: GENERAL RADIOLOGY | Facility: HOSPITAL | Age: 62
End: 2017-06-07

## 2017-06-07 DIAGNOSIS — F14.10 COCAINE ABUSE (HCC): Primary | ICD-10-CM

## 2017-06-07 DIAGNOSIS — R07.9 CHEST PAIN IN ADULT: ICD-10-CM

## 2017-06-07 LAB
ALBUMIN SERPL-MCNC: 4.6 G/DL (ref 3.5–5.2)
ALBUMIN/GLOB SERPL: 1.1 G/DL
ALP SERPL-CCNC: 79 U/L (ref 39–117)
ALT SERPL W P-5'-P-CCNC: 20 U/L (ref 1–41)
ANION GAP SERPL CALCULATED.3IONS-SCNC: 13.9 MMOL/L
AST SERPL-CCNC: 25 U/L (ref 1–40)
BASOPHILS # BLD AUTO: 0.01 10*3/MM3 (ref 0–0.2)
BASOPHILS NFR BLD AUTO: 0.1 % (ref 0–1.5)
BILIRUB SERPL-MCNC: 0.4 MG/DL (ref 0.1–1.2)
BUN BLD-MCNC: 23 MG/DL (ref 8–23)
BUN/CREAT SERPL: 18.7 (ref 7–25)
CALCIUM SPEC-SCNC: 10.3 MG/DL (ref 8.6–10.5)
CHLORIDE SERPL-SCNC: 98 MMOL/L (ref 98–107)
CO2 SERPL-SCNC: 25.1 MMOL/L (ref 22–29)
CREAT BLD-MCNC: 1.23 MG/DL (ref 0.76–1.27)
DEPRECATED RDW RBC AUTO: 50.9 FL (ref 37–54)
EOSINOPHIL # BLD AUTO: 0.01 10*3/MM3 (ref 0–0.7)
EOSINOPHIL NFR BLD AUTO: 0.1 % (ref 0.3–6.2)
ERYTHROCYTE [DISTWIDTH] IN BLOOD BY AUTOMATED COUNT: 15 % (ref 11.5–14.5)
ETHANOL BLD-MCNC: <10 MG/DL (ref 0–10)
ETHANOL UR QL: <0.01 %
GFR SERPL CREATININE-BSD FRML MDRD: 60 ML/MIN/1.73
GLOBULIN UR ELPH-MCNC: 4.3 GM/DL
GLUCOSE BLD-MCNC: 119 MG/DL (ref 65–99)
HCT VFR BLD AUTO: 48 % (ref 40.4–52.2)
HGB BLD-MCNC: 15.6 G/DL (ref 13.7–17.6)
HOLD SPECIMEN: NORMAL
IMM GRANULOCYTES # BLD: 0 10*3/MM3 (ref 0–0.03)
IMM GRANULOCYTES NFR BLD: 0 % (ref 0–0.5)
LYMPHOCYTES # BLD AUTO: 0.95 10*3/MM3 (ref 0.9–4.8)
LYMPHOCYTES NFR BLD AUTO: 14 % (ref 19.6–45.3)
MCH RBC QN AUTO: 30.5 PG (ref 27–32.7)
MCHC RBC AUTO-ENTMCNC: 32.5 G/DL (ref 32.6–36.4)
MCV RBC AUTO: 93.9 FL (ref 79.8–96.2)
MONOCYTES # BLD AUTO: 0.77 10*3/MM3 (ref 0.2–1.2)
MONOCYTES NFR BLD AUTO: 11.4 % (ref 5–12)
NEUTROPHILS # BLD AUTO: 5.04 10*3/MM3 (ref 1.9–8.1)
NEUTROPHILS NFR BLD AUTO: 74.4 % (ref 42.7–76)
PLATELET # BLD AUTO: 184 10*3/MM3 (ref 140–500)
PMV BLD AUTO: 10.5 FL (ref 6–12)
POTASSIUM BLD-SCNC: 4 MMOL/L (ref 3.5–5.2)
PROT SERPL-MCNC: 8.9 G/DL (ref 6–8.5)
RBC # BLD AUTO: 5.11 10*6/MM3 (ref 4.6–6)
SODIUM BLD-SCNC: 137 MMOL/L (ref 136–145)
TROPONIN T SERPL-MCNC: <0.01 NG/ML (ref 0–0.03)
TROPONIN T SERPL-MCNC: <0.01 NG/ML (ref 0–0.03)
WBC NRBC COR # BLD: 6.78 10*3/MM3 (ref 4.5–10.7)
WHOLE BLOOD HOLD SPECIMEN: NORMAL

## 2017-06-07 PROCEDURE — 80307 DRUG TEST PRSMV CHEM ANLYZR: CPT | Performed by: EMERGENCY MEDICINE

## 2017-06-07 PROCEDURE — 84484 ASSAY OF TROPONIN QUANT: CPT | Performed by: EMERGENCY MEDICINE

## 2017-06-07 PROCEDURE — 93005 ELECTROCARDIOGRAM TRACING: CPT

## 2017-06-07 PROCEDURE — 99284 EMERGENCY DEPT VISIT MOD MDM: CPT

## 2017-06-07 PROCEDURE — 96374 THER/PROPH/DIAG INJ IV PUSH: CPT

## 2017-06-07 PROCEDURE — 71020 HC CHEST PA AND LATERAL: CPT

## 2017-06-07 PROCEDURE — 25010000002 LORAZEPAM PER 2 MG: Performed by: EMERGENCY MEDICINE

## 2017-06-07 PROCEDURE — 80053 COMPREHEN METABOLIC PANEL: CPT | Performed by: EMERGENCY MEDICINE

## 2017-06-07 PROCEDURE — 93010 ELECTROCARDIOGRAM REPORT: CPT | Performed by: INTERNAL MEDICINE

## 2017-06-07 PROCEDURE — 36415 COLL VENOUS BLD VENIPUNCTURE: CPT | Performed by: EMERGENCY MEDICINE

## 2017-06-07 PROCEDURE — 85025 COMPLETE CBC W/AUTO DIFF WBC: CPT | Performed by: EMERGENCY MEDICINE

## 2017-06-07 PROCEDURE — 96361 HYDRATE IV INFUSION ADD-ON: CPT

## 2017-06-07 RX ORDER — SODIUM CHLORIDE 0.9 % (FLUSH) 0.9 %
10 SYRINGE (ML) INJECTION AS NEEDED
Status: DISCONTINUED | OUTPATIENT
Start: 2017-06-07 | End: 2017-06-08 | Stop reason: HOSPADM

## 2017-06-07 RX ORDER — LORAZEPAM 2 MG/ML
1 INJECTION INTRAMUSCULAR ONCE
Status: COMPLETED | OUTPATIENT
Start: 2017-06-07 | End: 2017-06-07

## 2017-06-07 RX ORDER — ASPIRIN 325 MG
325 TABLET ORAL ONCE
Status: DISCONTINUED | OUTPATIENT
Start: 2017-06-07 | End: 2017-06-07

## 2017-06-07 RX ADMIN — LORAZEPAM 1 MG: 2 INJECTION INTRAMUSCULAR; INTRAVENOUS at 21:41

## 2017-06-07 RX ADMIN — SODIUM CHLORIDE 500 ML: 9 INJECTION, SOLUTION INTRAVENOUS at 21:41

## 2017-06-07 NOTE — ED TRIAGE NOTES
Patient state he uses cocaine daily, he states that today he used and how his heart is beating fast and he is having chest pains. Patient is awake and alert. EKG was done and patient is anxious

## 2017-06-08 VITALS
TEMPERATURE: 98.1 F | DIASTOLIC BLOOD PRESSURE: 78 MMHG | OXYGEN SATURATION: 100 % | SYSTOLIC BLOOD PRESSURE: 138 MMHG | HEART RATE: 72 BPM | BODY MASS INDEX: 23.19 KG/M2 | WEIGHT: 175 LBS | HEIGHT: 73 IN | RESPIRATION RATE: 16 BRPM

## 2017-06-08 LAB
AMPHET+METHAMPHET UR QL: POSITIVE
BARBITURATES UR QL SCN: NEGATIVE
BENZODIAZ UR QL SCN: NEGATIVE
CANNABINOIDS SERPL QL: NEGATIVE
COCAINE UR QL: POSITIVE
METHADONE UR QL SCN: NEGATIVE
OPIATES UR QL: NEGATIVE
OXYCODONE UR QL SCN: NEGATIVE

## 2017-06-08 NOTE — ED NOTES
Pt reports using cocaine prior to having chest pains earlier this evening. Pt reports a history of substance abuse. Pt states that the pains have been constant since they began.      Shane Barnes RN  06/07/17 2013

## 2017-06-08 NOTE — ED PROVIDER NOTES
" EMERGENCY DEPARTMENT ENCOUNTER    CHIEF COMPLAINT  Chief Complaint: Overdose  History given by: Patient  History limited by:   Room Number: BURROWS/E  PMD: Kishor Camacho MD      HPI:  Pt is a 62 y.o. male who presents complaining of cocaine overdose today. Pt has a hx of daily cocaine use and has a hx of rehab. Today after using, he reports having severe chest pain and palpitations. He reports chest pain has improved to 5/10 since ED arrival. Pt has spoken with [facility] and here for medical clearance prior to admission.     Duration:  PTA  Onset: sudden  Timing: constant  Location: central chest  Radiation: none  Quality: \"pain\"  Intensity/Severity: severe  Progression: improving  Associated Symptoms: palpiations  Aggravating Factors: cocaine use  Alleviating Factors: none  Previous Episodes: hx of illicit drug use  Treatment before arrival: none    PAST MEDICAL HISTORY  Active Ambulatory Problems     Diagnosis Date Noted   • Sepsis 02/13/2017   • Low back pain 02/14/2017   • Drug abuse, IVDA cocaine 02/14/2017   • Cellulitis of upper extremity 02/14/2017   • MSSA (methicillin susceptible Staphylococcus aureus) septicemia 02/16/2017   • Septicemia 02/17/2017   • Drug-induced constipation 02/19/2017     Resolved Ambulatory Problems     Diagnosis Date Noted   • No Resolved Ambulatory Problems     Past Medical History:   Diagnosis Date   • Alcoholism    • Kidney failure    • Substance abuse        PAST SURGICAL HISTORY  Past Surgical History:   Procedure Laterality Date   • APPENDECTOMY         FAMILY HISTORY  Family History   Problem Relation Age of Onset   • Alcohol abuse Father        SOCIAL HISTORY  Social History     Social History   • Marital status: Single     Spouse name: N/A   • Number of children: N/A   • Years of education: N/A     Occupational History   • property management      Social History Main Topics   • Smoking status: Never Smoker   • Smokeless tobacco: Not on file   • Alcohol use No   • Drug " use: 7.00 per week     Special: Cocaine      Comment: uses cocaine daily    • Sexual activity: Not on file     Other Topics Concern   • Not on file     Social History Narrative   • No narrative on file       ALLERGIES  Review of patient's allergies indicates no known allergies.    REVIEW OF SYSTEMS  Review of Systems   Constitutional: Negative for activity change, appetite change and fever.   HENT: Negative for congestion and sore throat.    Eyes: Negative.    Respiratory: Negative for cough and shortness of breath.    Cardiovascular: Positive for chest pain and palpitations. Negative for leg swelling.   Gastrointestinal: Negative for abdominal pain, diarrhea and vomiting.   Endocrine: Negative.    Genitourinary: Negative for decreased urine volume and dysuria.   Musculoskeletal: Negative for neck pain.   Skin: Negative for rash and wound.   Allergic/Immunologic: Negative.    Neurological: Negative for weakness, numbness and headaches.   Hematological: Negative.    Psychiatric/Behavioral: Negative.    All other systems reviewed and are negative.      PHYSICAL EXAM  ED Triage Vitals   Temp Heart Rate Resp BP SpO2   06/07/17 1711 06/07/17 1707 06/07/17 1707 06/07/17 1711 06/07/17 1707   96.8 °F (36 °C) 99 20 107/68 100 %      Temp src Heart Rate Source Patient Position BP Location FiO2 (%)   06/07/17 1711 06/07/17 1707 -- -- --   Tympanic Monitor          Physical Exam   Constitutional: He is oriented to person, place, and time and well-developed, well-nourished, and in no distress.   HENT:   Head: Normocephalic and atraumatic.   Eyes: EOM are normal. Pupils are equal, round, and reactive to light.   Neck: Normal range of motion. Neck supple.   Cardiovascular: Normal rate, regular rhythm and normal heart sounds.    HR 70s   Pulmonary/Chest: Effort normal and breath sounds normal. No respiratory distress. He exhibits no tenderness.   Abdominal: Soft. There is no tenderness. There is no rebound and no guarding.    Musculoskeletal: Normal range of motion. He exhibits no edema or tenderness (no calf tenderness).   Neurological: He is alert and oriented to person, place, and time. He has normal sensation and normal strength.   Skin: Skin is warm and dry. No erythema.   BUE multiple needle sites. No chelsea abscess or cellulitis currently.   Psychiatric: Mood and affect normal.   Nursing note and vitals reviewed.      LAB RESULTS  Lab Results (last 24 hours)     Procedure Component Value Units Date/Time    CBC & Differential [[id number]] Collected:  06/07/17 1745    Specimen:  Blood Updated:  06/07/17 1756    Narrative:       The following orders were created for panel order CBC & Differential.  Procedure                               Abnormality         Status                     ---------                               -----------         ------                     CBC Auto Differential[[id number]]        Abnormal            Final result                 Please view results for these tests on the individual orders.    Comprehensive Metabolic Panel [[id number]]  (Abnormal) Collected:  06/07/17 1745    Specimen:  Blood Updated:  06/07/17 1825     Glucose 119 (H) mg/dL      BUN 23 mg/dL      Creatinine 1.23 mg/dL      Sodium 137 mmol/L      Potassium 4.0 mmol/L      Chloride 98 mmol/L      CO2 25.1 mmol/L      Calcium 10.3 mg/dL      Total Protein 8.9 (H) g/dL      Albumin 4.60 g/dL      ALT (SGPT) 20 U/L      AST (SGOT) 25 U/L      Alkaline Phosphatase 79 U/L      Total Bilirubin 0.4 mg/dL      eGFR Non African Amer 60 (L) mL/min/1.73      Globulin 4.3 gm/dL      A/G Ratio 1.1 g/dL      BUN/Creatinine Ratio 18.7     Anion Gap 13.9 mmol/L     Troponin [[id number]]  (Normal) Collected:  06/07/17 1745    Specimen:  Blood Updated:  06/07/17 1825     Troponin T <0.010 ng/mL     Narrative:       Troponin T Reference Ranges:  Less than 0.03 ng/mL:    Negative for AMI  0.03 to 0.09 ng/mL:      Indeterminant for AMI  Greater than 0.09 ng/mL:  Positive for AMI    CBC Auto Differential [[id number]]  (Abnormal) Collected:  06/07/17 1745    Specimen:  Blood Updated:  06/07/17 1756     WBC 6.78 10*3/mm3      RBC 5.11 10*6/mm3      Hemoglobin 15.6 g/dL      Hematocrit 48.0 %      MCV 93.9 fL      MCH 30.5 pg      MCHC 32.5 (L) g/dL      RDW 15.0 (H) %      RDW-SD 50.9 fl      MPV 10.5 fL      Platelets 184 10*3/mm3      Neutrophil % 74.4 %      Lymphocyte % 14.0 (L) %      Monocyte % 11.4 %      Eosinophil % 0.1 (L) %      Basophil % 0.1 %      Immature Grans % 0.0 %      Neutrophils, Absolute 5.04 10*3/mm3      Lymphocytes, Absolute 0.95 10*3/mm3      Monocytes, Absolute 0.77 10*3/mm3      Eosinophils, Absolute 0.01 10*3/mm3      Basophils, Absolute 0.01 10*3/mm3      Immature Grans, Absolute 0.00 10*3/mm3     Troponin [[id number]]  (Normal) Collected:  06/07/17 2141    Specimen:  Blood from Arm, Left Updated:  06/07/17 2226     Troponin T <0.010 ng/mL       Specimen hemolyzed.  Results may be affected.       Narrative:       Troponin T Reference Ranges:  Less than 0.03 ng/mL:    Negative for AMI  0.03 to 0.09 ng/mL:      Indeterminant for AMI  Greater than 0.09 ng/mL: Positive for AMI    Ethanol [[id number]] Collected:  06/07/17 2141    Specimen:  Blood from Arm, Left Updated:  06/07/17 2211     Ethanol <10 mg/dL      Ethanol % <0.010 %     Urine Drug Screen [[id number]]  (Abnormal) Collected:  06/07/17 2329    Specimen:  Urine from Urine, Clean Catch Updated:  06/08/17 0022     Amphet/Methamphet, Screen Positive (A)     Barbiturates Screen, Urine Negative     Benzodiazepine Screen, Urine Negative     Cocaine Screen, Urine Positive (A)     Opiate Screen Negative     THC, Screen, Urine Negative     Methadone Screen, Urine Negative     Oxycodone Screen, Urine Negative    Narrative:       Negative Thresholds For Drugs Screened:     Amphetamines               500 ng/ml   Barbiturates               200 ng/ml   Benzodiazepines            100 ng/ml   Cocaine                     300 ng/ml   Methadone                  300 ng/ml   Opiates                    300 ng/ml   Oxycodone                  100 ng/ml   THC                        50 ng/ml    The Normal Value for all drugs tested is negative. This report includes final unconfirmed screening results to be used for medical treatment purposes only. Unconfirmed results must not be used for non-medical purposes such as employment or legal testing. Clinical consideration should be applied to any drug of abuse test, particulary when unconfirmed results are used.          I ordered the above labs and reviewed the results    RADIOLOGY  XR Chest 2 View   Preliminary Result   No acute process identified.               I ordered the above noted radiological studies. Interpreted by radiologist. Reviewed by me in PACS.       PROCEDURES  Procedures  EKG    EKG time: 1706  Rhythm/Rate: NSR, 99BPM  No Acute Ischemia  Non-Specific ST-T changes    unchanged compared to prior on 6/5/17    Interpreted Contemporaneously by me.  Independently viewed by me      PROGRESS AND CONSULTS  ED Course   8:35 PM  Ordered blood work, CXR, and EKG. Ordered Ativan  12:22 AM  Rechecked with pt. Pt resting comfortably and in NAD. Informed pt of his negative workup and instructed to follow up with [facility] as scheduled for admission. Pt understands and agrees with plan. All concerns addressed.       MEDICAL DECISION MAKING      MDM  Number of Diagnoses or Management Options     Amount and/or Complexity of Data Reviewed  Clinical lab tests: ordered and reviewed  Tests in the radiology section of CPT®: ordered and reviewed  Tests in the medicine section of CPT®: reviewed and ordered (EKG - See EKG procedure note  )  Review and summarize past medical records: yes  Independent visualization of images, tracings, or specimens: yes           DIAGNOSIS  Final diagnoses:   Cocaine abuse   Chest pain in adult       DISPOSITION  DISCHARGE    Patient discharged in stable  condition.    Reviewed implications of results, diagnosis, meds, responsibility to follow up, warning signs and symptoms of possible worsening, potential complications and reasons to return to ER.    Patient/Family voiced understanding of above instructions.    Discussed plan for discharge, as there is no emergent indication for admission.  Pt/family is agreeable and understands need for follow up and repeat testing.  Pt is aware that discharge does not mean that nothing is wrong but it indicates no emergency is present that requires admission and they must continue care with follow-up as given below or physician of their choice.     FOLLOW-UP  [facility] [address] [address] [phone]  Go to  As scheduled         Medication List      Notice     No changes were made to your prescriptions during this visit.            Latest Documented Vital Signs:  As of 1:41 AM  BP- 138/78 HR- 72 Temp- 98.1 °F (36.7 °C) (Tympanic) O2 sat- 100%    --  Documentation assistance provided by hailey Olguin for Dr Jarvis.  Information recorded by the scribe was done at my direction and has been verified and validated by me.     Agustín Olguin  06/07/17 2104       Agustín Olguin  06/08/17 0027       Kalen Jarvis MD  06/08/17 0141

## 2017-06-08 NOTE — ED NOTES
"Pt sleeping but easily aroused.  Denies pain or complaints at this time. States \"just tired.\"     Tamara Miles RN  06/08/17 0002    "

## 2018-11-17 ENCOUNTER — HOSPITAL ENCOUNTER (EMERGENCY)
Facility: HOSPITAL | Age: 63
Discharge: HOME | End: 2018-11-17
Attending: EMERGENCY MEDICINE | Admitting: EMERGENCY MEDICINE

## 2018-11-17 VITALS
SYSTOLIC BLOOD PRESSURE: 117 MMHG | TEMPERATURE: 98.2 F | BODY MASS INDEX: 21.2 KG/M2 | OXYGEN SATURATION: 99 % | HEIGHT: 73 IN | HEART RATE: 94 BPM | DIASTOLIC BLOOD PRESSURE: 75 MMHG | RESPIRATION RATE: 18 BRPM | WEIGHT: 160 LBS

## 2018-11-17 DIAGNOSIS — L02.414 ABSCESS OF LEFT ARM: Primary | ICD-10-CM

## 2018-11-17 PROCEDURE — 87147 CULTURE TYPE IMMUNOLOGIC: CPT | Performed by: EMERGENCY MEDICINE

## 2018-11-17 PROCEDURE — 87205 SMEAR GRAM STAIN: CPT | Performed by: EMERGENCY MEDICINE

## 2018-11-17 PROCEDURE — 87070 CULTURE OTHR SPECIMN AEROBIC: CPT | Performed by: EMERGENCY MEDICINE

## 2018-11-17 PROCEDURE — 25010000002 ONDANSETRON PER 1 MG: Performed by: EMERGENCY MEDICINE

## 2018-11-17 PROCEDURE — 99152 MOD SED SAME PHYS/QHP 5/>YRS: CPT

## 2018-11-17 PROCEDURE — 90471 IMMUNIZATION ADMIN: CPT | Performed by: EMERGENCY MEDICINE

## 2018-11-17 PROCEDURE — 25010000002 TDAP 5-2.5-18.5 LF-MCG/0.5 SUSPENSION: Performed by: EMERGENCY MEDICINE

## 2018-11-17 PROCEDURE — 90715 TDAP VACCINE 7 YRS/> IM: CPT | Performed by: EMERGENCY MEDICINE

## 2018-11-17 PROCEDURE — 96372 THER/PROPH/DIAG INJ SC/IM: CPT

## 2018-11-17 PROCEDURE — 25010000002 MIDAZOLAM PER 1 MG: Performed by: EMERGENCY MEDICINE

## 2018-11-17 PROCEDURE — 96374 THER/PROPH/DIAG INJ IV PUSH: CPT

## 2018-11-17 PROCEDURE — 96375 TX/PRO/DX INJ NEW DRUG ADDON: CPT

## 2018-11-17 PROCEDURE — 99284 EMERGENCY DEPT VISIT MOD MDM: CPT

## 2018-11-17 RX ORDER — KETAMINE HYDROCHLORIDE 50 MG/ML
100 INJECTION, SOLUTION, CONCENTRATE INTRAMUSCULAR; INTRAVENOUS ONCE
Status: COMPLETED | OUTPATIENT
Start: 2018-11-17 | End: 2018-11-17

## 2018-11-17 RX ORDER — ONDANSETRON 2 MG/ML
4 INJECTION INTRAMUSCULAR; INTRAVENOUS ONCE
Status: COMPLETED | OUTPATIENT
Start: 2018-11-17 | End: 2018-11-17

## 2018-11-17 RX ORDER — LIDOCAINE HYDROCHLORIDE 10 MG/ML
20 INJECTION, SOLUTION EPIDURAL; INFILTRATION; INTRACAUDAL; PERINEURAL ONCE
Status: COMPLETED | OUTPATIENT
Start: 2018-11-17 | End: 2018-11-17

## 2018-11-17 RX ORDER — CLINDAMYCIN HYDROCHLORIDE 150 MG/1
450 CAPSULE ORAL 4 TIMES DAILY
Qty: 84 CAPSULE | Refills: 0 | Status: SHIPPED | OUTPATIENT
Start: 2018-11-17 | End: 2018-11-24

## 2018-11-17 RX ORDER — HYDROCODONE BITARTRATE AND ACETAMINOPHEN 5; 325 MG/1; MG/1
1 TABLET ORAL EVERY 6 HOURS PRN
Qty: 12 TABLET | Refills: 0 | Status: SHIPPED | OUTPATIENT
Start: 2018-11-17 | End: 2018-11-20

## 2018-11-17 RX ORDER — MIDAZOLAM HYDROCHLORIDE 1 MG/ML
2 INJECTION INTRAMUSCULAR; INTRAVENOUS ONCE
Status: COMPLETED | OUTPATIENT
Start: 2018-11-17 | End: 2018-11-17

## 2018-11-17 RX ADMIN — CLINDAMYCIN HYDROCHLORIDE 450 MG: 150 CAPSULE ORAL at 20:54

## 2018-11-17 RX ADMIN — TETANUS TOXOID, REDUCED DIPHTHERIA TOXOID AND ACELLULAR PERTUSSIS VACCINE, ADSORBED 0.5 ML: 5; 2.5; 8; 8; 2.5 SUSPENSION INTRAMUSCULAR at 20:51

## 2018-11-17 RX ADMIN — MIDAZOLAM HYDROCHLORIDE 2 MG: 2 INJECTION, SOLUTION INTRAMUSCULAR; INTRAVENOUS at 19:53

## 2018-11-17 RX ADMIN — LIDOCAINE HYDROCHLORIDE: 10 INJECTION, SOLUTION EPIDURAL; INFILTRATION; INTRACAUDAL; PERINEURAL at 19:50

## 2018-11-17 RX ADMIN — KETAMINE HYDROCHLORIDE 100 MG: 50 INJECTION, SOLUTION INTRAMUSCULAR; INTRAVENOUS at 19:50

## 2018-11-17 RX ADMIN — ONDANSETRON HYDROCHLORIDE 4 MG: 2 SOLUTION INTRAMUSCULAR; INTRAVENOUS at 19:48

## 2018-11-17 NOTE — ED NOTES
Pt complains of an abscess on his left arm due to IV drug use of methamphetamines 4 days ago.  Abscess started showing up 2 days ago.  PT also complains of not being able to urinate.  Bladder scanner showed 100cc.  No intervention indicated.      Georgina Marti, RN  11/17/18 1824       Georgina Marti, RN  11/17/18 1824

## 2018-11-18 NOTE — ED NOTES
100mg of katamine and 2mg of versed given to Agustín LI RN due to shift change.      Georgina Marti, RN  11/17/18 1925

## 2018-11-19 LAB
BACTERIA SPEC AEROBE CULT: ABNORMAL
GRAM STN SPEC: ABNORMAL
GRAM STN SPEC: ABNORMAL

## 2021-04-15 ENCOUNTER — DOCUMENTATION (OUTPATIENT)
Dept: PEDIATRICS | Facility: HOSPITAL | Age: 66
End: 2021-04-15

## 2023-10-10 ENCOUNTER — APPOINTMENT (OUTPATIENT)
Dept: GENERAL RADIOLOGY | Facility: HOSPITAL | Age: 68
End: 2023-10-10
Payer: MEDICARE

## 2023-10-10 ENCOUNTER — HOSPITAL ENCOUNTER (EMERGENCY)
Facility: HOSPITAL | Age: 68
Discharge: HOME | End: 2023-10-10
Attending: EMERGENCY MEDICINE | Admitting: EMERGENCY MEDICINE
Payer: MEDICARE

## 2023-10-10 VITALS
TEMPERATURE: 98.2 F | HEIGHT: 73 IN | OXYGEN SATURATION: 95 % | DIASTOLIC BLOOD PRESSURE: 91 MMHG | BODY MASS INDEX: 22.53 KG/M2 | WEIGHT: 170 LBS | HEART RATE: 81 BPM | RESPIRATION RATE: 18 BRPM | SYSTOLIC BLOOD PRESSURE: 137 MMHG

## 2023-10-10 DIAGNOSIS — F19.10 POLYSUBSTANCE ABUSE: ICD-10-CM

## 2023-10-10 DIAGNOSIS — R07.89 ATYPICAL CHEST PAIN: Primary | ICD-10-CM

## 2023-10-10 LAB
ALBUMIN SERPL-MCNC: 3.9 G/DL (ref 3.5–5.2)
ALBUMIN/GLOB SERPL: 1.6 G/DL
ALP SERPL-CCNC: 76 U/L (ref 39–117)
ALT SERPL W P-5'-P-CCNC: 14 U/L (ref 1–41)
ANION GAP SERPL CALCULATED.3IONS-SCNC: 13.5 MMOL/L (ref 5–15)
APTT PPP: 23.4 SECONDS (ref 22.7–35.4)
AST SERPL-CCNC: 19 U/L (ref 1–40)
BASOPHILS # BLD AUTO: 0.04 10*3/MM3 (ref 0–0.2)
BASOPHILS NFR BLD AUTO: 0.7 % (ref 0–1.5)
BILIRUB SERPL-MCNC: 0.3 MG/DL (ref 0–1.2)
BUN SERPL-MCNC: 17 MG/DL (ref 8–23)
BUN/CREAT SERPL: 25 (ref 7–25)
CALCIUM SPEC-SCNC: 9.1 MG/DL (ref 8.6–10.5)
CHLORIDE SERPL-SCNC: 107 MMOL/L (ref 98–107)
CO2 SERPL-SCNC: 23.5 MMOL/L (ref 22–29)
CREAT SERPL-MCNC: 0.68 MG/DL (ref 0.76–1.27)
DEPRECATED RDW RBC AUTO: 44.9 FL (ref 37–54)
EGFRCR SERPLBLD CKD-EPI 2021: 101.9 ML/MIN/1.73
EOSINOPHIL # BLD AUTO: 0.14 10*3/MM3 (ref 0–0.4)
EOSINOPHIL NFR BLD AUTO: 2.4 % (ref 0.3–6.2)
ERYTHROCYTE [DISTWIDTH] IN BLOOD BY AUTOMATED COUNT: 12.9 % (ref 12.3–15.4)
GEN 5 2HR TROPONIN T REFLEX: 11 NG/L
GLOBULIN UR ELPH-MCNC: 2.5 GM/DL
GLUCOSE SERPL-MCNC: 102 MG/DL (ref 65–99)
HCT VFR BLD AUTO: 39.8 % (ref 37.5–51)
HGB BLD-MCNC: 13.6 G/DL (ref 13–17.7)
IMM GRANULOCYTES # BLD AUTO: 0.02 10*3/MM3 (ref 0–0.05)
IMM GRANULOCYTES NFR BLD AUTO: 0.3 % (ref 0–0.5)
INR PPP: 0.99 (ref 0.9–1.1)
LYMPHOCYTES # BLD AUTO: 1.59 10*3/MM3 (ref 0.7–3.1)
LYMPHOCYTES NFR BLD AUTO: 27.3 % (ref 19.6–45.3)
MCH RBC QN AUTO: 32.6 PG (ref 26.6–33)
MCHC RBC AUTO-ENTMCNC: 34.2 G/DL (ref 31.5–35.7)
MCV RBC AUTO: 95.4 FL (ref 79–97)
MONOCYTES # BLD AUTO: 0.7 10*3/MM3 (ref 0.1–0.9)
MONOCYTES NFR BLD AUTO: 12 % (ref 5–12)
NEUTROPHILS NFR BLD AUTO: 3.34 10*3/MM3 (ref 1.7–7)
NEUTROPHILS NFR BLD AUTO: 57.3 % (ref 42.7–76)
NRBC BLD AUTO-RTO: 0 /100 WBC (ref 0–0.2)
NT-PROBNP SERPL-MCNC: <36 PG/ML (ref 0–900)
PLATELET # BLD AUTO: 163 10*3/MM3 (ref 140–450)
PMV BLD AUTO: 10.4 FL (ref 6–12)
POTASSIUM SERPL-SCNC: 3.6 MMOL/L (ref 3.5–5.2)
PROT SERPL-MCNC: 6.4 G/DL (ref 6–8.5)
PROTHROMBIN TIME: 13.1 SECONDS (ref 11.7–14.2)
QT INTERVAL: 448 MS
QTC INTERVAL: 463 MS
RBC # BLD AUTO: 4.17 10*6/MM3 (ref 4.14–5.8)
SODIUM SERPL-SCNC: 144 MMOL/L (ref 136–145)
TROPONIN T DELTA: NORMAL
TROPONIN T SERPL HS-MCNC: <6 NG/L
WBC NRBC COR # BLD: 5.83 10*3/MM3 (ref 3.4–10.8)

## 2023-10-10 PROCEDURE — 36415 COLL VENOUS BLD VENIPUNCTURE: CPT

## 2023-10-10 PROCEDURE — 99284 EMERGENCY DEPT VISIT MOD MDM: CPT

## 2023-10-10 PROCEDURE — 85610 PROTHROMBIN TIME: CPT | Performed by: PHYSICIAN ASSISTANT

## 2023-10-10 PROCEDURE — 85025 COMPLETE CBC W/AUTO DIFF WBC: CPT | Performed by: PHYSICIAN ASSISTANT

## 2023-10-10 PROCEDURE — 85730 THROMBOPLASTIN TIME PARTIAL: CPT | Performed by: PHYSICIAN ASSISTANT

## 2023-10-10 PROCEDURE — 80053 COMPREHEN METABOLIC PANEL: CPT | Performed by: PHYSICIAN ASSISTANT

## 2023-10-10 PROCEDURE — 83880 ASSAY OF NATRIURETIC PEPTIDE: CPT | Performed by: PHYSICIAN ASSISTANT

## 2023-10-10 PROCEDURE — 93005 ELECTROCARDIOGRAM TRACING: CPT

## 2023-10-10 PROCEDURE — 93010 ELECTROCARDIOGRAM REPORT: CPT | Performed by: INTERNAL MEDICINE

## 2023-10-10 PROCEDURE — 71045 X-RAY EXAM CHEST 1 VIEW: CPT

## 2023-10-10 PROCEDURE — 84484 ASSAY OF TROPONIN QUANT: CPT | Performed by: PHYSICIAN ASSISTANT

## 2023-10-10 NOTE — DISCHARGE INSTRUCTIONS
Work to limit your substance use as this is likely contributing to your chest pain.  Follow-up with your cardiologist, Dr. Montgomery.  Follow-up with your primary care provider.  Return to emergency department for any worsening symptoms.  
alert

## 2023-10-10 NOTE — ED PROVIDER NOTES
EMERGENCY DEPARTMENT ENCOUNTER    Room Number:  01/01  PCP: Savannah Moran APRN  Discussed/ obtained information from independent historians: EMS      HPI:  Chief Complaint: Chest pain  A complete HPI/ROS/PMH/PSH/SH/FH are unobtainable due to: Acute intoxication  Context: Tre Owens is a 68 y.o. male who presents to the ED c/o chest pain.  Patient reports he developed tightness in his chest with associated shortness of breath approximately 1 hour prior to arrival.  Reports his symptoms have resolved at this time.  He does admit to smoking cocaine and methamphetamine approximately 2 hours ago.  Reports that he is using these to help deal with the break-up.  He is now requesting food and wants to order [place].  Patient reports he was previously anticoagulated but was taken off of this.  He denies injury or trauma to the chest.  Patient denies headache, lightheadedness, syncope, diaphoresis, nausea, vomiting, or any other systemic complaints.      External (non-ED) record review:   Reviewed note from office visit with hand surgery on 9/5/2023 where patient seen for bilateral elbow pain.  Reviewed assessment and plan.  Patient will treat with compression using elbow sleeves.  Reviewed prior laboratory studies.  Most recent CBC with hemoglobin 13.1.  Most recent CMP with creatinine 0.83.      PAST MEDICAL HISTORY  Active Ambulatory Problems     Diagnosis Date Noted    Sepsis 02/13/2017    Low back pain 02/14/2017    Drug abuse, IVDA cocaine 02/14/2017    Cellulitis of upper extremity 02/14/2017    MSSA (methicillin susceptible Staphylococcus aureus) septicemia 02/16/2017    Septicemia 02/17/2017    Drug-induced constipation 02/19/2017     Resolved Ambulatory Problems     Diagnosis Date Noted    No Resolved Ambulatory Problems     Past Medical History:   Diagnosis Date    Alcoholism     Kidney failure     Substance abuse          PAST SURGICAL HISTORY  Past Surgical History:   Procedure Laterality Date     APPENDECTOMY           FAMILY HISTORY  Family History   Problem Relation Age of Onset    Alcohol abuse Father          SOCIAL HISTORY  Social History     Socioeconomic History    Marital status: Single   Tobacco Use    Smoking status: Never   Substance and Sexual Activity    Alcohol use: No    Drug use: Yes     Frequency: 7.0 times per week     Types: Cocaine(coke), Methamphetamines     Comment: uses cocaine daily          ALLERGIES  Patient has no known allergies.        REVIEW OF SYSTEMS  Review of Systems   Constitutional:  Negative for chills and fever.   HENT:  Negative for ear pain and sore throat.    Respiratory:  Positive for chest tightness and shortness of breath. Negative for cough.    Cardiovascular:  Negative for chest pain and palpitations.   Gastrointestinal:  Negative for abdominal pain and vomiting.   Genitourinary:  Negative for dysuria and hematuria.   Musculoskeletal:  Negative for arthralgias and joint swelling.   Skin:  Negative for pallor and rash.   Neurological:  Negative for syncope and headaches.   Psychiatric/Behavioral:  Negative for confusion and hallucinations.             PHYSICAL EXAM  ED Triage Vitals [10/10/23 0749]   Temp Heart Rate Resp BP SpO2   98.2 øF (36.8 øC) 76 16 147/90 98 %      Temp src Heart Rate Source Patient Position BP Location FiO2 (%)   Tympanic Monitor Lying Right arm --       Physical Exam  Constitutional:       General: He is not in acute distress.     Appearance: Normal appearance.   HENT:      Head: Normocephalic and atraumatic.      Nose: Nose normal.      Mouth/Throat:      Mouth: Mucous membranes are moist.   Eyes:      Conjunctiva/sclera: Conjunctivae normal.      Pupils: Pupils are equal, round, and reactive to light.   Cardiovascular:      Rate and Rhythm: Normal rate and regular rhythm.      Pulses: Normal pulses.      Heart sounds: Normal heart sounds.      Comments: Distal pulses intact  Pulmonary:      Effort: Pulmonary effort is normal.      Breath  sounds: Normal breath sounds.   Abdominal:      General: There is no distension.   Musculoskeletal:         General: Normal range of motion.      Cervical back: Normal range of motion and neck supple.   Skin:     General: Skin is warm.      Capillary Refill: Capillary refill takes less than 2 seconds.   Neurological:      General: No focal deficit present.      Mental Status: He is alert and oriented to person, place, and time.   Psychiatric:         Mood and Affect: Mood is elated.         Behavior: Behavior is hyperactive.           Vital signs and nursing notes reviewed.          LAB RESULTS  Recent Results (from the past 24 hour(s))   ECG 12 Lead Chest Pain    Collection Time: 10/10/23  8:00 AM   Result Value Ref Range    QT Interval 448 ms    QTC Interval 463 ms   Comprehensive Metabolic Panel    Collection Time: 10/10/23  8:03 AM    Specimen: Blood   Result Value Ref Range    Glucose 102 (H) 65 - 99 mg/dL    BUN 17 8 - 23 mg/dL    Creatinine 0.68 (L) 0.76 - 1.27 mg/dL    Sodium 144 136 - 145 mmol/L    Potassium 3.6 3.5 - 5.2 mmol/L    Chloride 107 98 - 107 mmol/L    CO2 23.5 22.0 - 29.0 mmol/L    Calcium 9.1 8.6 - 10.5 mg/dL    Total Protein 6.4 6.0 - 8.5 g/dL    Albumin 3.9 3.5 - 5.2 g/dL    ALT (SGPT) 14 1 - 41 U/L    AST (SGOT) 19 1 - 40 U/L    Alkaline Phosphatase 76 39 - 117 U/L    Total Bilirubin 0.3 0.0 - 1.2 mg/dL    Globulin 2.5 gm/dL    A/G Ratio 1.6 g/dL    BUN/Creatinine Ratio 25.0 7.0 - 25.0    Anion Gap 13.5 5.0 - 15.0 mmol/L    eGFR 101.9 >60.0 mL/min/1.73   Protime-INR    Collection Time: 10/10/23  8:03 AM    Specimen: Blood   Result Value Ref Range    Protime 13.1 11.7 - 14.2 Seconds    INR 0.99 0.90 - 1.10   aPTT    Collection Time: 10/10/23  8:03 AM    Specimen: Blood   Result Value Ref Range    PTT 23.4 22.7 - 35.4 seconds   BNP    Collection Time: 10/10/23  8:03 AM    Specimen: Blood   Result Value Ref Range    proBNP <36.0 0.0 - 900.0 pg/mL   High Sensitivity Troponin T    Collection  Time: 10/10/23  8:03 AM    Specimen: Blood   Result Value Ref Range    HS Troponin T <6 <15 ng/L   CBC Auto Differential    Collection Time: 10/10/23  8:03 AM    Specimen: Blood   Result Value Ref Range    WBC 5.83 3.40 - 10.80 10*3/mm3    RBC 4.17 4.14 - 5.80 10*6/mm3    Hemoglobin 13.6 13.0 - 17.7 g/dL    Hematocrit 39.8 37.5 - 51.0 %    MCV 95.4 79.0 - 97.0 fL    MCH 32.6 26.6 - 33.0 pg    MCHC 34.2 31.5 - 35.7 g/dL    RDW 12.9 12.3 - 15.4 %    RDW-SD 44.9 37.0 - 54.0 fl    MPV 10.4 6.0 - 12.0 fL    Platelets 163 140 - 450 10*3/mm3    Neutrophil % 57.3 42.7 - 76.0 %    Lymphocyte % 27.3 19.6 - 45.3 %    Monocyte % 12.0 5.0 - 12.0 %    Eosinophil % 2.4 0.3 - 6.2 %    Basophil % 0.7 0.0 - 1.5 %    Immature Grans % 0.3 0.0 - 0.5 %    Neutrophils, Absolute 3.34 1.70 - 7.00 10*3/mm3    Lymphocytes, Absolute 1.59 0.70 - 3.10 10*3/mm3    Monocytes, Absolute 0.70 0.10 - 0.90 10*3/mm3    Eosinophils, Absolute 0.14 0.00 - 0.40 10*3/mm3    Basophils, Absolute 0.04 0.00 - 0.20 10*3/mm3    Immature Grans, Absolute 0.02 0.00 - 0.05 10*3/mm3    nRBC 0.0 0.0 - 0.2 /100 WBC   High Sensitivity Troponin T 2Hr    Collection Time: 10/10/23 10:30 AM    Specimen: Blood   Result Value Ref Range    HS Troponin T 11 <15 ng/L    Troponin T Delta         Ordered the above labs and reviewed the results.        RADIOLOGY  XR Chest 1 View    Result Date: 10/10/2023  XR CHEST 1 VW-  HISTORY: Male who is 68 years-old, chest pain  TECHNIQUE: Frontal view of the chest  COMPARISON: 6/7/2017  FINDINGS: Heart, mediastinum and pulmonary vasculature are unremarkable. No focal pulmonary consolidation, pleural effusion, or pneumothorax. No acute osseous process.      No evidence for acute pulmonary process. Follow-up as clinical indications persist.  This report was finalized on 10/10/2023 8:29 AM by Dr. Jose Manuel Billingsley M.D on Workstation: [id number]       Ordered the above noted radiological studies. Reviewed by me in PACS.                  MEDICAL  DECISION MAKING, PROGRESS, and CONSULTS    All labs have been independently reviewed by me.  All radiology studies have been reviewed by me and I have also reviewed the radiology report.   EKG's independently viewed and interpreted by me.  Discussion below represents my analysis of pertinent findings related to patient's condition, differential diagnosis, treatment plan and final disposition.            Orders placed during this visit:  Orders Placed This Encounter   Procedures    XR Chest 1 View    Comprehensive Metabolic Panel    Protime-INR    aPTT    BNP    High Sensitivity Troponin T    CBC Auto Differential    High Sensitivity Troponin T 2Hr    ECG 12 Lead Chest Pain    CBC & Differential           Differential diagnosis:  Coronary vasospasm, aspiration pneumonia, polysubstance use      Independent interpretation of labs, radiology studies, and discussions with consultants:  ED Course as of 10/10/23 1507   Tue Oct 10, 2023   0831 Chest x-ray independently interpreted by me as no pneumothorax [MP]   1111 HEART Score: 3   [MP]   1503 Patient presents emergency department with chest pain after doing methamphetamine and cocaine.  Worked up today with labs including serial troponin.  He is to negative troponins.  No acute ischemic changes noted on EKG.  Reassuring heart score of 3.  He remains chest pain-free.  Discussed that he would need to follow-up with PCP as well as cardiology.  Discussed ED return precautions.  Encouraged him to cease use of illicit substances.  He is otherwise well-appearing, hemodynamically stable, and therefore appropriate for discharge. [MP]      ED Course User Index  [MP] Melissa Davies, HERLINDA         Additional orders considered but not ordered:  D-dimer      Additional sources:    - Chronic or social conditions impacting care: Polysubstance use          DIAGNOSIS  Final diagnoses:   Atypical chest pain   Polysubstance abuse           Follow Up:  Niles Montgomery  [address]  [facility] [address]  [phone]    Schedule an appointment as soon as possible for a visit   For reevaluation after ED visit    Savannah Moran, APRN  [address] [facility] [id number] [phone]    Schedule an appointment as soon as possible for a visit   For reevaluation after ED visit      RX:     Medication List      No changes were made to your prescriptions during this visit.         Latest Documented Vital Signs:  As of 15:03 EDT  BP- 137/91 HR- 81 Temp- 98.2 øF (36.8 øC) (Tympanic) O2 sat- 95%              --    Please note that portions of this were completed with a voice recognition program.       Note Disclaimer: At [facility], we believe that sharing information builds trust and better relationships. You are receiving this note because you are receiving care at [facility] or recently visited. It is possible you will see health information before a provider has talked with you about it. This kind of information can be easy to misunderstand. To help you fully understand what it means for your health, we urge you to discuss this note with your provider.             Melissa Davies PA-C  10/10/23 1508

## 2023-10-10 NOTE — ED NOTES
Pt top ed from home via EMS    EMS reports pt has used cocaine and meth the past 2 days. For past hour pt c/o CP and anxiety.  Pain is substernal and non radiating.